# Patient Record
Sex: MALE | Race: BLACK OR AFRICAN AMERICAN | NOT HISPANIC OR LATINO | Employment: FULL TIME | URBAN - METROPOLITAN AREA
[De-identification: names, ages, dates, MRNs, and addresses within clinical notes are randomized per-mention and may not be internally consistent; named-entity substitution may affect disease eponyms.]

---

## 2023-04-25 ENCOUNTER — LAB (OUTPATIENT)
Dept: LAB | Facility: LAB | Age: 43
End: 2023-04-25
Payer: COMMERCIAL

## 2023-04-25 ENCOUNTER — OFFICE VISIT (OUTPATIENT)
Dept: PRIMARY CARE | Facility: CLINIC | Age: 43
End: 2023-04-25
Payer: COMMERCIAL

## 2023-04-25 VITALS
BODY MASS INDEX: 37.94 KG/M2 | OXYGEN SATURATION: 97 % | HEART RATE: 61 BPM | SYSTOLIC BLOOD PRESSURE: 128 MMHG | RESPIRATION RATE: 15 BRPM | WEIGHT: 295.6 LBS | DIASTOLIC BLOOD PRESSURE: 72 MMHG | HEIGHT: 74 IN | TEMPERATURE: 97.9 F

## 2023-04-25 DIAGNOSIS — Z00.00 WELLNESS EXAMINATION: Primary | ICD-10-CM

## 2023-04-25 DIAGNOSIS — J30.9 ALLERGIC RHINITIS, UNSPECIFIED SEASONALITY, UNSPECIFIED TRIGGER: ICD-10-CM

## 2023-04-25 DIAGNOSIS — Z00.00 WELLNESS EXAMINATION: ICD-10-CM

## 2023-04-25 DIAGNOSIS — Z12.5 SCREENING PSA (PROSTATE SPECIFIC ANTIGEN): ICD-10-CM

## 2023-04-25 DIAGNOSIS — Z12.11 ENCOUNTER FOR SCREENING FOR MALIGNANT NEOPLASM OF COLON: ICD-10-CM

## 2023-04-25 DIAGNOSIS — G47.33 OSA ON CPAP: ICD-10-CM

## 2023-04-25 DIAGNOSIS — Z23 NEED FOR VACCINATION: ICD-10-CM

## 2023-04-25 DIAGNOSIS — R73.03 PREDIABETES: ICD-10-CM

## 2023-04-25 DIAGNOSIS — E66.01 CLASS 2 SEVERE OBESITY WITH SERIOUS COMORBIDITY AND BODY MASS INDEX (BMI) OF 37.0 TO 37.9 IN ADULT, UNSPECIFIED OBESITY TYPE (MULTI): ICD-10-CM

## 2023-04-25 DIAGNOSIS — E78.00 HIGH CHOLESTEROL: ICD-10-CM

## 2023-04-25 PROBLEM — E66.813 OBESITY, CLASS III, BMI 40-49.9 (MORBID OBESITY): Status: RESOLVED | Noted: 2017-05-26 | Resolved: 2023-04-25

## 2023-04-25 PROBLEM — J30.1 SEASONAL ALLERGIC RHINITIS DUE TO POLLEN: Status: ACTIVE | Noted: 2019-02-26

## 2023-04-25 PROBLEM — J45.20 MILD INTERMITTENT ASTHMA WITHOUT COMPLICATION (HHS-HCC): Status: ACTIVE | Noted: 2019-02-26

## 2023-04-25 PROBLEM — E66.813 OBESITY, CLASS III, BMI 40-49.9 (MORBID OBESITY): Status: ACTIVE | Noted: 2017-05-26

## 2023-04-25 PROBLEM — S83.206A TEAR OF CARTILAGE OF RIGHT KNEE: Status: ACTIVE | Noted: 2019-09-05

## 2023-04-25 PROBLEM — M19.012 OSTEOARTHRITIS OF LEFT SHOULDER: Status: ACTIVE | Noted: 2023-04-25

## 2023-04-25 PROBLEM — N62 GYNECOMASTIA, MALE: Status: ACTIVE | Noted: 2023-04-25

## 2023-04-25 PROBLEM — M79.601 PAIN OF RIGHT UPPER EXTREMITY: Status: ACTIVE | Noted: 2023-04-25

## 2023-04-25 PROBLEM — M25.521 RIGHT ELBOW PAIN: Status: ACTIVE | Noted: 2023-04-25

## 2023-04-25 PROBLEM — M25.512 ARTHRALGIA OF LEFT ACROMIOCLAVICULAR JOINT: Status: ACTIVE | Noted: 2020-01-24

## 2023-04-25 PROBLEM — J45.20 MILD INTERMITTENT ASTHMA WITHOUT COMPLICATION (HHS-HCC): Status: RESOLVED | Noted: 2019-02-26 | Resolved: 2023-04-25

## 2023-04-25 LAB
ALANINE AMINOTRANSFERASE (SGPT) (U/L) IN SER/PLAS: 19 U/L (ref 10–52)
ALBUMIN (G/DL) IN SER/PLAS: 4.4 G/DL (ref 3.4–5)
ALKALINE PHOSPHATASE (U/L) IN SER/PLAS: 60 U/L (ref 33–120)
ANION GAP IN SER/PLAS: 10 MMOL/L (ref 10–20)
ASPARTATE AMINOTRANSFERASE (SGOT) (U/L) IN SER/PLAS: 17 U/L (ref 9–39)
BASOPHILS (10*3/UL) IN BLOOD BY AUTOMATED COUNT: 0.05 X10E9/L (ref 0–0.1)
BASOPHILS/100 LEUKOCYTES IN BLOOD BY AUTOMATED COUNT: 0.8 % (ref 0–2)
BILIRUBIN TOTAL (MG/DL) IN SER/PLAS: 0.6 MG/DL (ref 0–1.2)
CALCIUM (MG/DL) IN SER/PLAS: 10.2 MG/DL (ref 8.6–10.6)
CARBON DIOXIDE, TOTAL (MMOL/L) IN SER/PLAS: 31 MMOL/L (ref 21–32)
CHLORIDE (MMOL/L) IN SER/PLAS: 105 MMOL/L (ref 98–107)
CHOLESTEROL (MG/DL) IN SER/PLAS: 194 MG/DL (ref 0–199)
CHOLESTEROL IN HDL (MG/DL) IN SER/PLAS: 75 MG/DL
CHOLESTEROL/HDL RATIO: 2.6
CREATININE (MG/DL) IN SER/PLAS: 1.02 MG/DL (ref 0.5–1.3)
EOSINOPHILS (10*3/UL) IN BLOOD BY AUTOMATED COUNT: 0.31 X10E9/L (ref 0–0.7)
EOSINOPHILS/100 LEUKOCYTES IN BLOOD BY AUTOMATED COUNT: 4.7 % (ref 0–6)
ERYTHROCYTE DISTRIBUTION WIDTH (RATIO) BY AUTOMATED COUNT: 14 % (ref 11.5–14.5)
ERYTHROCYTE MEAN CORPUSCULAR HEMOGLOBIN CONCENTRATION (G/DL) BY AUTOMATED: 31.6 G/DL (ref 32–36)
ERYTHROCYTE MEAN CORPUSCULAR VOLUME (FL) BY AUTOMATED COUNT: 86 FL (ref 80–100)
ERYTHROCYTES (10*6/UL) IN BLOOD BY AUTOMATED COUNT: 5.03 X10E12/L (ref 4.5–5.9)
ESTIMATED AVERAGE GLUCOSE FOR HBA1C: 111 MG/DL
GFR MALE: >90 ML/MIN/1.73M2
GLUCOSE (MG/DL) IN SER/PLAS: 93 MG/DL (ref 74–99)
HEMATOCRIT (%) IN BLOOD BY AUTOMATED COUNT: 43.1 % (ref 41–52)
HEMOGLOBIN (G/DL) IN BLOOD: 13.6 G/DL (ref 13.5–17.5)
HEMOGLOBIN A1C/HEMOGLOBIN TOTAL IN BLOOD: 5.5 %
IMMATURE GRANULOCYTES/100 LEUKOCYTES IN BLOOD BY AUTOMATED COUNT: 0.2 % (ref 0–0.9)
LDL: 108 MG/DL (ref 0–99)
LEUKOCYTES (10*3/UL) IN BLOOD BY AUTOMATED COUNT: 6.6 X10E9/L (ref 4.4–11.3)
LYMPHOCYTES (10*3/UL) IN BLOOD BY AUTOMATED COUNT: 2.77 X10E9/L (ref 1.2–4.8)
LYMPHOCYTES/100 LEUKOCYTES IN BLOOD BY AUTOMATED COUNT: 42 % (ref 13–44)
MONOCYTES (10*3/UL) IN BLOOD BY AUTOMATED COUNT: 0.63 X10E9/L (ref 0.1–1)
MONOCYTES/100 LEUKOCYTES IN BLOOD BY AUTOMATED COUNT: 9.5 % (ref 2–10)
NEUTROPHILS (10*3/UL) IN BLOOD BY AUTOMATED COUNT: 2.83 X10E9/L (ref 1.2–7.7)
NEUTROPHILS/100 LEUKOCYTES IN BLOOD BY AUTOMATED COUNT: 42.8 % (ref 40–80)
NRBC (PER 100 WBCS) BY AUTOMATED COUNT: 0 /100 WBC (ref 0–0)
PLATELETS (10*3/UL) IN BLOOD AUTOMATED COUNT: 199 X10E9/L (ref 150–450)
POTASSIUM (MMOL/L) IN SER/PLAS: 4.3 MMOL/L (ref 3.5–5.3)
PROSTATE SPECIFIC ANTIGEN,SCREEN: 0.98 NG/ML (ref 0–4)
PROTEIN TOTAL: 6.7 G/DL (ref 6.4–8.2)
SODIUM (MMOL/L) IN SER/PLAS: 142 MMOL/L (ref 136–145)
THYROTROPIN (MIU/L) IN SER/PLAS BY DETECTION LIMIT <= 0.05 MIU/L: 1.52 MIU/L (ref 0.44–3.98)
TRIGLYCERIDE (MG/DL) IN SER/PLAS: 56 MG/DL (ref 0–149)
UREA NITROGEN (MG/DL) IN SER/PLAS: 12 MG/DL (ref 6–23)
VLDL: 11 MG/DL (ref 0–40)

## 2023-04-25 PROCEDURE — 1036F TOBACCO NON-USER: CPT | Performed by: FAMILY MEDICINE

## 2023-04-25 PROCEDURE — 99396 PREV VISIT EST AGE 40-64: CPT | Performed by: FAMILY MEDICINE

## 2023-04-25 PROCEDURE — 90472 IMMUNIZATION ADMIN EACH ADD: CPT | Performed by: FAMILY MEDICINE

## 2023-04-25 PROCEDURE — 90715 TDAP VACCINE 7 YRS/> IM: CPT | Performed by: FAMILY MEDICINE

## 2023-04-25 PROCEDURE — 80061 LIPID PANEL: CPT

## 2023-04-25 PROCEDURE — 90471 IMMUNIZATION ADMIN: CPT | Performed by: FAMILY MEDICINE

## 2023-04-25 PROCEDURE — 83036 HEMOGLOBIN GLYCOSYLATED A1C: CPT

## 2023-04-25 PROCEDURE — 36415 COLL VENOUS BLD VENIPUNCTURE: CPT

## 2023-04-25 PROCEDURE — 80053 COMPREHEN METABOLIC PANEL: CPT

## 2023-04-25 PROCEDURE — 99213 OFFICE O/P EST LOW 20 MIN: CPT | Performed by: FAMILY MEDICINE

## 2023-04-25 PROCEDURE — 84443 ASSAY THYROID STIM HORMONE: CPT

## 2023-04-25 PROCEDURE — 90739 HEPB VACC 2/4 DOSE ADULT IM: CPT | Performed by: FAMILY MEDICINE

## 2023-04-25 PROCEDURE — 84153 ASSAY OF PSA TOTAL: CPT

## 2023-04-25 PROCEDURE — 85025 COMPLETE CBC W/AUTO DIFF WBC: CPT

## 2023-04-25 PROCEDURE — 3008F BODY MASS INDEX DOCD: CPT | Performed by: FAMILY MEDICINE

## 2023-04-25 RX ORDER — MONTELUKAST SODIUM 10 MG/1
10 TABLET ORAL NIGHTLY
Qty: 90 TABLET | Refills: 1 | Status: SHIPPED | OUTPATIENT
Start: 2023-04-25 | End: 2023-10-22

## 2023-04-25 RX ORDER — MULTIVITAMIN
1 TABLET ORAL
COMMUNITY

## 2023-04-25 RX ORDER — LORATADINE 10 MG/1
10 TABLET ORAL
COMMUNITY
Start: 2019-02-26 | End: 2024-03-05 | Stop reason: WASHOUT

## 2023-04-25 RX ORDER — ALBUTEROL SULFATE 90 UG/1
AEROSOL, METERED RESPIRATORY (INHALATION)
COMMUNITY
Start: 2019-02-26

## 2023-04-25 RX ORDER — FLUTICASONE PROPIONATE 93 UG/1
93 SPRAY, METERED NASAL 2 TIMES DAILY
Qty: 16 ML | Refills: 3 | Status: SHIPPED | OUTPATIENT
Start: 2023-04-25

## 2023-04-25 ASSESSMENT — PATIENT HEALTH QUESTIONNAIRE - PHQ9
SUM OF ALL RESPONSES TO PHQ9 QUESTIONS 1 AND 2: 0
2. FEELING DOWN, DEPRESSED OR HOPELESS: NOT AT ALL
1. LITTLE INTEREST OR PLEASURE IN DOING THINGS: NOT AT ALL

## 2023-04-25 ASSESSMENT — LIFESTYLE VARIABLES: HOW MANY STANDARD DRINKS CONTAINING ALCOHOL DO YOU HAVE ON A TYPICAL DAY: PATIENT DOES NOT DRINK

## 2023-04-25 NOTE — PATIENT INSTRUCTIONS
Assessment/Plan   Problem List Items Addressed This Visit          Nervous    KAHLIL on CPAP  - to call Dr. Hamilton for appt       Circulatory    Essential hypertension= resolved after using CPAP      Relevant Orders    Comprehensive Metabolic Panel    Hemoglobin A1C    Lipid Panel    TSH with reflex to Free T4 if abnormal       Other    Allergic rhinitis  - starting on new meds below  -let me know if not better and if there is an issue with the Xhance    Relevant Medications    montelukast (Singulair) 10 mg tablet    fluticasone propionate (Xhance) 93 mcg/actuation aerosol breath activated    Other Relevant Orders    Referral to Allergy    High cholesterol  - check labs    Relevant Orders    Comprehensive Metabolic Panel    Hemoglobin A1C    Lipid Panel    TSH with reflex to Free T4 if abnormal     Other Visit Diagnoses       Wellness examination    -  Primary  - Continue to work on healthy diet and exercise  We encourage all patients to engage in exercise 150 minutes per week   Adults 18 and older, activity during each week - if you are able:    Engage in at least 150 minutes of moderate or vigorous exercise per week   If you are able- Engage in muscle strengthening activity on 2 or more days per week      Relevant Orders    Colonoscopy    CBC and Auto Differential    Comprehensive Metabolic Panel    Hemoglobin A1C    Lipid Panel    Prostate Specific Antigen, Screen    TSH with reflex to Free T4 if abnormal    Prediabetes      - wt loss can help  - labs    Relevant Orders    CBC and Auto Differential    Comprehensive Metabolic Panel    Hemoglobin A1C    Lipid Panel    TSH with reflex to Free T4 if abnormal    Screening PSA (prostate specific antigen)   Checking again today       Relevant Orders    Prostate Specific Antigen, Screen    Class 2 severe obesity with serious comorbidity and body mass index (BMI) of 37.0 to 37.9 in adult, unspecified obesity type (CMS/HCC)      - wt loss can help    Relevant Orders    CBC and  Auto Differential    Comprehensive Metabolic Panel    Hemoglobin A1C    Lipid Panel    TSH with reflex to Free T4 if abnormal    Encounter for screening for malignant neoplasm of colon    - ordered colonoscopy      Relevant Orders    Colonoscopy    Need for vaccination      - TODAY the below was given    Relevant Orders    Hepatitis B vaccine, adult (HEPLISAV)- repeat due in 1 month    Tdap vaccine, age 10 years and older (BOOSTRIX)            Please follow up in 4wks for hep B #2 with nurse clinic and 3 months with PCP for allergy or as needed.       ** If labs or imaging ordered at today's visit, all the non-urgent results will be discussed at your next visit    If you have been referred for a special test or to a specialist please call  [phone] to [facility] an appointment.  If you have any further questions, or if develop new or worsened symptoms, please give our office a call at [phone].

## 2023-04-25 NOTE — PROGRESS NOTES
"Subjective   Patient ID: GAMALIEL Will is a 43 y.o. male who presents for Annual Exam.    Well Adult Physical   Does not want a chaperone.      Health Maintenance:  - Colonoscopy: due at 43yr- ordered  - PSA- due -ordered   COVID vaccination completed- including bivalent booster   Due fort TDAP and Hep B series- given today      Other concerns/issues/followup:  1 - KAHLIL X 10yrs+  using CPAP- sleeping well with   Would like follow up with sleep study- past due for follow up    2 - allergy   season is restarting with post nasal drainage   States allergies have worsen        PMSFH was reviewed & updated.    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 TDAP    Review of Systems  All systems reviewed and neg if not noted in the HPI above   Objective   /72 (Patient Position: Sitting)   Pulse 61   Temp 36.6 °C (97.9 °F)   Resp 15   Ht 1.88 m (6' 2\")   Wt 134 kg (295 lb 9.6 oz)   SpO2 97%   BMI 37.95 kg/m²     Physical Exam  Pleasant  Eyes: conjunctiva non-icteric and eye lids are without obvious rash or drooping. Pupils are symmetric.   Ears, Nose, Mouth, and Throat: External ears and nose appear to be without deformity or rash. No lesions or masses noted. Hearing is grossly intact.   CV: RRR, no murmur  Carotids: no bruits  Thyroid nml  Pulm:CTA B/L  Abd: soft, NTTP, + BS  LE: no edema  Psychiatric: Alert, orientation to person, place, and time. Recent/remote memory as evidenced through face-to-face interaction and discussion appear grossly intact. Mood and affect are normal.     Assessment/Plan   Problem List Items Addressed This Visit          Nervous    KAHLIL on CPAP  - to call Dr. Hamilton for appt       Circulatory    Essential hypertension= resolved after using CPAP      " Relevant Orders    Comprehensive Metabolic Panel    Hemoglobin A1C    Lipid Panel    TSH with reflex to Free T4 if abnormal       Other    Allergic rhinitis  - starting on new meds below  -let me know if not better and if there is an issue with the Xhance    Relevant Medications    montelukast (Singulair) 10 mg tablet    fluticasone propionate (Xhance) 93 mcg/actuation aerosol breath activated    Other Relevant Orders    Referral to Allergy    High cholesterol  - check labs    Relevant Orders    Comprehensive Metabolic Panel    Hemoglobin A1C    Lipid Panel    TSH with reflex to Free T4 if abnormal     Other Visit Diagnoses       Wellness examination    -  Primary  - Continue to work on healthy diet and exercise  We encourage all patients to engage in exercise 150 minutes per week   Adults 18 and older, activity during each week - if you are able:    Engage in at least 150 minutes of moderate or vigorous exercise per week   If you are able- Engage in muscle strengthening activity on 2 or more days per week      Relevant Orders    Colonoscopy    CBC and Auto Differential    Comprehensive Metabolic Panel    Hemoglobin A1C    Lipid Panel    Prostate Specific Antigen, Screen    TSH with reflex to Free T4 if abnormal    Prediabetes      - wt loss can help  - labs    Relevant Orders    CBC and Auto Differential    Comprehensive Metabolic Panel    Hemoglobin A1C    Lipid Panel    TSH with reflex to Free T4 if abnormal    Screening PSA (prostate specific antigen)   Checking again today       Relevant Orders    Prostate Specific Antigen, Screen    Class 2 severe obesity with serious comorbidity and body mass index (BMI) of 37.0 to 37.9 in adult, unspecified obesity type (CMS/HCC)      - wt loss can help    Relevant Orders    CBC and Auto Differential    Comprehensive Metabolic Panel    Hemoglobin A1C    Lipid Panel    TSH with reflex to Free T4 if abnormal    Encounter for screening for malignant neoplasm of colon    -  ordered colonoscopy      Relevant Orders    Colonoscopy    Need for vaccination      - TODAY the below was given    Relevant Orders    Hepatitis B vaccine, adult (HEPLISAV)- repeat due in 1 month    Tdap vaccine, age 10 years and older (BOOSTRIX)            Please follow up in 4wks for hep B #2 with nurse clinic and 3 months with PCP for allergy or as needed.       ** If labs or imaging ordered at today's visit, all the non-urgent results will be discussed at your next visit    If you have been referred for a special test or to a specialist please call  [phone] to schedule an appointment.  If you have any further questions, or if develop new or worsened symptoms, please give our office a call at [phone].

## 2023-04-26 ENCOUNTER — TELEPHONE (OUTPATIENT)
Dept: PRIMARY CARE | Facility: CLINIC | Age: 43
End: 2023-04-26
Payer: COMMERCIAL

## 2023-04-26 NOTE — TELEPHONE ENCOUNTER
Result Communication    Resulted Orders   CBC and Auto Differential   Result Value Ref Range    WBC 6.6 4.4 - 11.3 x10E9/L    nRBC 0.0 0.0 - 0.0 /100 WBC    RBC 5.03 4.50 - 5.90 x10E12/L    Hemoglobin 13.6 13.5 - 17.5 g/dL    Hematocrit 43.1 41.0 - 52.0 %    MCV 86 80 - 100 fL    MCHC 31.6 (L) 32.0 - 36.0 g/dL    Platelets 199 150 - 450 x10E9/L    RDW 14.0 11.5 - 14.5 %    Neutrophils % 42.8 40.0 - 80.0 %    Immature Granulocytes %, Automated 0.2 0.0 - 0.9 %      Comment:       Immature Granulocyte Count (IG) includes promyelocytes,    myelocytes and metamyelocytes but does not include bands.   Percent differential counts (%) should be interpreted in the   context of the absolute cell counts (cells/L).    Lymphocytes % 42.0 13.0 - 44.0 %    Monocytes % 9.5 2.0 - 10.0 %    Eosinophils % 4.7 0.0 - 6.0 %    Basophils % 0.8 0.0 - 2.0 %    Neutrophils Absolute 2.83 1.20 - 7.70 x10E9/L    Lymphocytes Absolute 2.77 1.20 - 4.80 x10E9/L    Monocytes Absolute 0.63 0.10 - 1.00 x10E9/L    Eosinophils Absolute 0.31 0.00 - 0.70 x10E9/L    Basophils Absolute 0.05 0.00 - 0.10 x10E9/L   Comprehensive Metabolic Panel   Result Value Ref Range    Glucose 93 74 - 99 mg/dL    Sodium 142 136 - 145 mmol/L    Potassium 4.3 3.5 - 5.3 mmol/L    Chloride 105 98 - 107 mmol/L    Bicarbonate 31 21 - 32 mmol/L    Anion Gap 10 10 - 20 mmol/L    Urea Nitrogen 12 6 - 23 mg/dL    Creatinine 1.02 0.50 - 1.30 mg/dL    GFR MALE >90 >90 mL/min/1.73m2      Comment:       CALCULATIONS OF ESTIMATED GFR ARE PERFORMED   USING THE 2021 CKD-EPI STUDY REFIT EQUATION   WITHOUT THE RACE VARIABLE FOR THE IDMS-TRACEABLE   CREATININE METHODS.    https://jasn.asnjournals.org/content/early/2021/09/22/ASN.3852875266    Calcium 10.2 8.6 - 10.6 mg/dL    Albumin 4.4 3.4 - 5.0 g/dL    Alkaline Phosphatase 60 33 - 120 U/L    Total Protein 6.7 6.4 - 8.2 g/dL    AST 17 9 - 39 U/L    Total Bilirubin 0.6 0.0 - 1.2 mg/dL    ALT (SGPT) 19 10 - 52 U/L      Comment:       Patients  treated with Sulfasalazine may generate    falsely decreased results for ALT.   Hemoglobin A1C   Result Value Ref Range    Hemoglobin A1C 5.5 %      Comment:           Diagnosis of Diabetes-Adults   Non-Diabetic: < or = 5.6%   Increased risk for developing diabetes: 5.7-6.4%   Diagnostic of diabetes: > or = 6.5%  .       Monitoring of Diabetes                Age (y)     Therapeutic Goal (%)   Adults:          >18           <7.0   Pediatrics:    13-18           <7.5                   7-12           <8.0                   0- 6            7.5-8.5   American Diabetes Association. Diabetes Care 33(S1), Jan 2010.    Estimated Average Glucose 111 MG/DL   Lipid Panel   Result Value Ref Range    Cholesterol 194 0 - 199 mg/dL      Comment:      .      AGE      DESIRABLE   BORDERLINE HIGH   HIGH     0-19 Y     0 - 169       170 - 199     >/= 200    20-24 Y     0 - 189       190 - 224     >/= 225         >24 Y     0 - 199       200 - 239     >/= 240   **All ranges are based on fasting samples. Specific   therapeutic targets will vary based on patient-specific   cardiac risk.  .   Pediatric guidelines reference:Pediatrics 2011, 128(S5).   Adult guidelines reference: NCEP ATPIII Guidelines,     LONA 2001, 258:2486-97  .   Venipuncture immediately after or during the    administration of Metamizole may lead to falsely   low results. Testing should be performed immediately   prior to Metamizole dosing.    HDL 75.0 mg/dL      Comment:      .      AGE      VERY LOW   LOW     NORMAL    HIGH       0-19 Y       < 35   < 40     40-45     ----    20-24 Y       ----   < 40       >45     ----      >24 Y       ----   < 40     40-60      >60  .    Cholesterol/HDL Ratio 2.6       Comment:      REF VALUES  DESIRABLE  < 3.4  HIGH RISK  > 5.0     (H) 0 - 99 mg/dL      Comment:      .                           NEAR      BORD      AGE      DESIRABLE  OPTIMAL    HIGH     HIGH     VERY HIGH     0-19 Y     0 - 109     ---    110-129   >/= 130      ----    20-24 Y     0 - 119     ---    120-159   >/= 160     ----      >24 Y     0 -  99   100-129  130-159   160-189     >/=190  .    VLDL 11 0 - 40 mg/dL    Triglycerides 56 0 - 149 mg/dL      Comment:      .      AGE      DESIRABLE   BORDERLINE HIGH   HIGH     VERY HIGH   0 D-90 D    19 - 174         ----         ----        ----  91 D- 9 Y     0 -  74        75 -  99     >/= 100      ----    10-19 Y     0 -  89        90 - 129     >/= 130      ----    20-24 Y     0 - 114       115 - 149     >/= 150      ----         >24 Y     0 - 149       150 - 199    200- 499    >/= 500  .   Venipuncture immediately after or during the    administration of Metamizole may lead to falsely   low results. Testing should be performed immediately   prior to Metamizole dosing.   Prostate Specific Antigen, Screen   Result Value Ref Range    Prostate Specific Antigen,Screen 0.98 0.00 - 4.00 ng/mL      Comment:      The FDA requires that the method used for PSA assay be   reported to the physician. Values obtained with different   assay methods must not be used interchangeably. This test   was performed at [facility] using the Siemens  Sensdata PSA method, which is a sandwich immunoassay using   chemiluminescence for quantitation. The assay is approved  for measurement of prostate-specific antigen (PSA) in   serum and may be used in conjunction with a digital rectal  examination in men 50 years and older as an aid in   detection of prostate cancer.   5-Alpha-reductase inhibitors (e.g. Proscar, Finasteride,   Avodart, Dutasteride and Ashley) for the treatment of BPH   have been shown to lower PSA levels by an average of 50%   after 6 months of treatment.   TSH with reflex to Free T4 if abnormal   Result Value Ref Range    TSH 1.52 0.44 - 3.98 mIU/L      Comment:       TSH testing is performed using different testing    methodology at [facility] than at other    [facility] hospitals. Direct result comparisons  should    only be made within the same method.       9:37 AM      Results {WERE / WERE NOT:06885} successfully communicated with the {RHEUM PARENT/PATIENT:93590} and they {AMB Acknowledged/Did Not Acknowledge:32539} their understanding.

## 2023-05-25 ENCOUNTER — CLINICAL SUPPORT (OUTPATIENT)
Dept: PRIMARY CARE | Facility: CLINIC | Age: 43
End: 2023-05-25
Payer: COMMERCIAL

## 2023-05-25 VITALS
HEART RATE: 85 BPM | RESPIRATION RATE: 16 BRPM | WEIGHT: 295 LBS | BODY MASS INDEX: 37.86 KG/M2 | OXYGEN SATURATION: 98 % | SYSTOLIC BLOOD PRESSURE: 126 MMHG | TEMPERATURE: 97.7 F | DIASTOLIC BLOOD PRESSURE: 72 MMHG | HEIGHT: 74 IN

## 2023-05-25 DIAGNOSIS — Z23 ENCOUNTER FOR IMMUNIZATION: ICD-10-CM

## 2023-05-25 PROCEDURE — 90739 HEPB VACC 2/4 DOSE ADULT IM: CPT | Performed by: FAMILY MEDICINE

## 2023-05-25 PROCEDURE — 90471 IMMUNIZATION ADMIN: CPT | Performed by: FAMILY MEDICINE

## 2023-05-25 NOTE — PROGRESS NOTES
Pt here for second Hep-B vaccine. Pt has not received any other vaccine within the past two weeks. Administered 0.5 ml IM in RD of Heplisav-B. Pt tolerated well and vitals are stable. Pt will refrain from any other vaccine for two weeks.

## 2023-08-10 ENCOUNTER — OFFICE VISIT (OUTPATIENT)
Dept: PRIMARY CARE | Facility: CLINIC | Age: 43
End: 2023-08-10
Payer: COMMERCIAL

## 2023-08-10 VITALS
TEMPERATURE: 97 F | HEART RATE: 64 BPM | DIASTOLIC BLOOD PRESSURE: 78 MMHG | HEIGHT: 74 IN | BODY MASS INDEX: 37.73 KG/M2 | RESPIRATION RATE: 15 BRPM | OXYGEN SATURATION: 95 % | WEIGHT: 294 LBS | SYSTOLIC BLOOD PRESSURE: 122 MMHG

## 2023-08-10 DIAGNOSIS — Z00.00 WELLNESS EXAMINATION: ICD-10-CM

## 2023-08-10 DIAGNOSIS — E66.01 CLASS 2 SEVERE OBESITY WITH SERIOUS COMORBIDITY AND BODY MASS INDEX (BMI) OF 37.0 TO 37.9 IN ADULT, UNSPECIFIED OBESITY TYPE (MULTI): ICD-10-CM

## 2023-08-10 DIAGNOSIS — Z12.5 SCREENING PSA (PROSTATE SPECIFIC ANTIGEN): ICD-10-CM

## 2023-08-10 DIAGNOSIS — E78.00 HIGH CHOLESTEROL: ICD-10-CM

## 2023-08-10 DIAGNOSIS — J30.1 SEASONAL ALLERGIC RHINITIS DUE TO POLLEN: Primary | ICD-10-CM

## 2023-08-10 DIAGNOSIS — R09.81 NASAL CONGESTION: ICD-10-CM

## 2023-08-10 PROCEDURE — 1036F TOBACCO NON-USER: CPT | Performed by: FAMILY MEDICINE

## 2023-08-10 PROCEDURE — 3008F BODY MASS INDEX DOCD: CPT | Performed by: FAMILY MEDICINE

## 2023-08-10 PROCEDURE — 99213 OFFICE O/P EST LOW 20 MIN: CPT | Performed by: FAMILY MEDICINE

## 2023-08-10 RX ORDER — EPINASTINE HYDROCHLORIDE 0.5 MG/ML
1 SOLUTION/ DROPS OPHTHALMIC 2 TIMES DAILY
COMMUNITY
Start: 2023-07-09 | End: 2024-03-05 | Stop reason: WASHOUT

## 2023-08-10 ASSESSMENT — PATIENT HEALTH QUESTIONNAIRE - PHQ9
2. FEELING DOWN, DEPRESSED OR HOPELESS: NOT AT ALL
1. LITTLE INTEREST OR PLEASURE IN DOING THINGS: NOT AT ALL
SUM OF ALL RESPONSES TO PHQ9 QUESTIONS 1 AND 2: 0

## 2023-08-10 NOTE — PROGRESS NOTES
"Subjective   Patient ID: GAMALIEL Will is a 43 y.o. male who presents for Follow-up (Pt is following up for allergies.).    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   Ears, Nose, Mouth, and Throat:  appear to be without deformity or rash. No lesions or masses noted. Hearing is grossly intact.   Respiratory: No gasping or shortness of breath noted, no use of accessory muscles noted.   Skin: No obvious rashes or lesions  identified on skin.   Psychiatric: Alert, orientation to person, place, and time. Recent/remote memory as evidenced through face-to-face interaction and discussion appear grossly intact.   Mood and affect are normal.        Assessment/Plan   Problem List Items Addressed This Visit       Allergic rhinitis - Primary    High cholesterol    Relevant Orders    CBC and Auto Differential    Comprehensive Metabolic Panel    Hemoglobin A1C    Lipid Panel    Prostate Specific Antigen    TSH with reflex to Free T4 if abnormal     Other Visit Diagnoses       Wellness examination        Relevant Orders    CBC and Auto Differential    Comprehensive Metabolic Panel    Hemoglobin A1C    Lipid Panel    Prostate Specific Antigen    TSH with reflex to Free T4 if " abnormal    Screening PSA (prostate specific antigen)        Relevant Orders    CBC and Auto Differential    Comprehensive Metabolic Panel    Hemoglobin A1C    Lipid Panel    Prostate Specific Antigen    TSH with reflex to Free T4 if abnormal    Class 2 severe obesity with serious comorbidity and body mass index (BMI) of 37.0 to 37.9 in adult, unspecified obesity type (CMS/HCC)        Relevant Orders    CBC and Auto Differential    Comprehensive Metabolic Panel    Hemoglobin A1C    Lipid Panel    Prostate Specific Antigen    TSH with reflex to Free T4 if abnormal              Please follow up in March for Wellness  (as scheduled)or as needed.

## 2023-08-10 NOTE — PATIENT INSTRUCTIONS
Allergies  - ok to start the singular 2months prior to your allergy season in the spring- Feb    Continue with cycling    Nasal congestion  - referral for ENT    Please follow up in March for Wellness  (as scheduled)or as needed.       ** If labs or imaging ordered at today's visit, all the non-urgent results will be discussed at your next visit    If you have been referred for a special test or to a specialist please call  [phone] to schedule an appointment.  If you have any further questions, or if develop new or worsened symptoms, please give our office a call at [phone].

## 2024-01-08 ENCOUNTER — TELEPHONE (OUTPATIENT)
Dept: SLEEP MEDICINE | Facility: HOSPITAL | Age: 44
End: 2024-01-08
Payer: COMMERCIAL

## 2024-01-08 NOTE — TELEPHONE ENCOUNTER
Called and left patient VM to call back and schedule their yearly appointment with Dr. Hamilton to renew his PAP supply prescription. Central Scheduling number [phone] given to call back for schedulingand Sleep Nurse number [phone] given to call back with any questions.

## 2024-01-16 ENCOUNTER — ANCILLARY PROCEDURE (OUTPATIENT)
Dept: RADIOLOGY | Facility: CLINIC | Age: 44
End: 2024-01-16
Payer: COMMERCIAL

## 2024-01-16 ENCOUNTER — OFFICE VISIT (OUTPATIENT)
Dept: ORTHOPEDIC SURGERY | Facility: CLINIC | Age: 44
End: 2024-01-16
Payer: COMMERCIAL

## 2024-01-16 VITALS — HEIGHT: 74 IN | BODY MASS INDEX: 37.73 KG/M2 | WEIGHT: 294 LBS

## 2024-01-16 DIAGNOSIS — M77.8 ELBOW TENDONITIS: ICD-10-CM

## 2024-01-16 DIAGNOSIS — G89.29 CHRONIC PAIN OF RIGHT ELBOW: Primary | ICD-10-CM

## 2024-01-16 DIAGNOSIS — M19.029 ELBOW ARTHRITIS: ICD-10-CM

## 2024-01-16 DIAGNOSIS — M25.521 CHRONIC PAIN OF RIGHT ELBOW: Primary | ICD-10-CM

## 2024-01-16 DIAGNOSIS — M25.521 RIGHT ELBOW PAIN: Primary | ICD-10-CM

## 2024-01-16 DIAGNOSIS — M25.521 RIGHT ELBOW PAIN: ICD-10-CM

## 2024-01-16 PROBLEM — S83.206A TEAR OF CARTILAGE OF RIGHT KNEE: Status: RESOLVED | Noted: 2019-09-05 | Resolved: 2024-01-16

## 2024-01-16 PROCEDURE — 3008F BODY MASS INDEX DOCD: CPT | Performed by: ORTHOPAEDIC SURGERY

## 2024-01-16 PROCEDURE — 73080 X-RAY EXAM OF ELBOW: CPT | Mod: RIGHT SIDE | Performed by: STUDENT IN AN ORGANIZED HEALTH CARE EDUCATION/TRAINING PROGRAM

## 2024-01-16 PROCEDURE — 1036F TOBACCO NON-USER: CPT | Performed by: ORTHOPAEDIC SURGERY

## 2024-01-16 PROCEDURE — 73080 X-RAY EXAM OF ELBOW: CPT | Mod: RT

## 2024-01-16 PROCEDURE — 99213 OFFICE O/P EST LOW 20 MIN: CPT | Performed by: ORTHOPAEDIC SURGERY

## 2024-01-16 PROCEDURE — 99203 OFFICE O/P NEW LOW 30 MIN: CPT | Performed by: ORTHOPAEDIC SURGERY

## 2024-01-16 RX ORDER — MELOXICAM 15 MG/1
15 TABLET ORAL DAILY
Qty: 30 TABLET | Refills: 1 | Status: SHIPPED | OUTPATIENT
Start: 2024-01-16 | End: 2025-01-15

## 2024-01-16 ASSESSMENT — PAIN DESCRIPTION - DESCRIPTORS: DESCRIPTORS: ACHING

## 2024-01-16 ASSESSMENT — PAIN - FUNCTIONAL ASSESSMENT: PAIN_FUNCTIONAL_ASSESSMENT: 0-10

## 2024-01-16 ASSESSMENT — PAIN SCALES - GENERAL: PAINLEVEL_OUTOF10: 7

## 2024-01-16 NOTE — LETTER
January 17, 2024     Claribel Xavier DO  [address]  [address]  [facility] [address]    Patient: ODILON Will   YOB: 1980   Date of Visit: 1/16/2024       Dear Dr. Claribel Xavier DO:    Thank you for referring ODILON Will to me for evaluation. Below are my notes for this consultation.  If you have questions, please do not hesitate to call me. I look forward to following your patient along with you.       Sincerely,     Shaan Atkins MD      CC: No Recipients  ______________________________________________________________________________________    Subjective   Patient ID: ODILON Will is a 43 y.o. male.    Chief Complaint: Pain of the Right Elbow (Popping out of place)     Last Surgery: No surgery found  Last Surgery Date: No surgery found    HPI  Patient ID: Odilon Will is a 43 y.o. male patient who presents today for Pain of the Right Elbow (Popping out of place). The patient had elbow pain several years ago where he visited Dr. Gutiérrez. He was diagnosed with bicep and tricep tendonitis. This resolved overtime but was recently exacerbated by over the head movements and doing things over the holidays. Patient reports no injury. Patient reports dull pain, soreness, and aches. He experiences a popping sensation in his elbow at extension. He was not able to completely straighten the elbow but experienced no pain until recently. He experiences difficulty brushing teeth and shaving back of head.     Objective  Ortho Exam    Physical Examination Findings:  Constitutional: Appears well-developed and well-nourished.  Head: Normocephalic and atraumatic.  Eyes: Pupils are equal and round.  Cardiovascular: Intact distal pulses.   Respiratory: Effort normal. No respiratory distress.  Neurologic: Alert and oriented to person, place, and time.  Skin: Skin is warm and dry.  Hematologic / Lympahtic: No lymphedema, lymphangitis.  Psychiatric: normal mood and affect. Behavior is normal.    Musculoskeletal: Normal appearance, full elbow flexion, raOM smooth, popping in elbow joint, bicep tenod I strong, mild tricep tenderne to pal, 5/5 strength normal distal motor and vascular examination.    X-ray shows degenerative early arthritis in elbow.     Assessment/Plan  Encounter Diagnoses:  Chronic pain of right elbow    Elbow arthritis    Elbow tendonitis       Recommended a conservative approach to these symptoms.  Referral to therapy provided with a prescription for anti-inflammatory medications.  He will contact my office if his symptoms or not improving after the next 6 to 8 weeks of this program.  In that situation we would like him to obtain a CT scan of the right elbow prior to follow-up      Scribe Attestation  By signing my name below, I, Ruben Oliveros , Lars   attest that this documentation has been prepared under the direction and in the presence of Shaan Atkins MD.

## 2024-01-16 NOTE — PROGRESS NOTES
Subjective    Patient ID: GAMALIEL Will is a 43 y.o. male.    Chief Complaint: Pain of the Right Elbow (Popping out of place)     Last Surgery: No surgery found  Last Surgery Date: No surgery found    HPI  Patient ID: Gamaliel Will is a 43 y.o. male patient who presents today for Pain of the Right Elbow (Popping out of place). The patient had elbow pain several years ago where he visited Dr. Gutiérrez. He was diagnosed with bicep and tricep tendonitis. This resolved overtime but was recently exacerbated by over the head movements and doing things over the holidays. Patient reports no injury. Patient reports dull pain, soreness, and aches. He experiences a popping sensation in his elbow at extension. He was not able to completely straighten the elbow but experienced no pain until recently. He experiences difficulty brushing teeth and shaving back of head.     Objective   Ortho Exam    Physical Examination Findings:  Constitutional: Appears well-developed and well-nourished.  Head: Normocephalic and atraumatic.  Eyes: Pupils are equal and round.  Cardiovascular: Intact distal pulses.   Respiratory: Effort normal. No respiratory distress.  Neurologic: Alert and oriented to person, place, and time.  Skin: Skin is warm and dry.  Hematologic / Lympahtic: No lymphedema, lymphangitis.  Psychiatric: normal mood and affect. Behavior is normal.   Musculoskeletal: Normal appearance, full elbow flexion, raOM smooth, popping in elbow joint, bicep tenod I strong, mild tricep tenderne to pal, 5/5 strength normal distal motor and vascular examination.    X-ray shows degenerative early arthritis in elbow.     Assessment/Plan   Encounter Diagnoses:  Chronic pain of right elbow    Elbow arthritis    Elbow tendonitis       Recommended a conservative approach to these symptoms.  Referral to therapy provided with a prescription for anti-inflammatory medications.  He will contact my office if his symptoms or not improving after the next  6 to 8 weeks of this program.  In that situation we would like him to obtain a CT scan of the right elbow prior to follow-up      Scribe Attestation  By signing my name below, I, Ruben Oliveros , Lars   attest that this documentation has been prepared under the direction and in the presence of Shaan Atkins MD.

## 2024-02-26 ENCOUNTER — OFFICE VISIT (OUTPATIENT)
Dept: SLEEP MEDICINE | Facility: CLINIC | Age: 44
End: 2024-02-26
Payer: COMMERCIAL

## 2024-02-26 VITALS
HEART RATE: 51 BPM | BODY MASS INDEX: 39.8 KG/M2 | DIASTOLIC BLOOD PRESSURE: 80 MMHG | OXYGEN SATURATION: 95 % | WEIGHT: 310 LBS | RESPIRATION RATE: 16 BRPM | TEMPERATURE: 97.8 F | SYSTOLIC BLOOD PRESSURE: 132 MMHG

## 2024-02-26 DIAGNOSIS — G47.33 OSA ON CPAP: Primary | ICD-10-CM

## 2024-02-26 PROCEDURE — 99202 OFFICE O/P NEW SF 15 MIN: CPT | Performed by: PHYSICIAN ASSISTANT

## 2024-02-26 PROCEDURE — 3008F BODY MASS INDEX DOCD: CPT | Performed by: PHYSICIAN ASSISTANT

## 2024-02-26 PROCEDURE — 1036F TOBACCO NON-USER: CPT | Performed by: PHYSICIAN ASSISTANT

## 2024-02-26 ASSESSMENT — PAIN SCALES - GENERAL: PAINLEVEL: 0-NO PAIN

## 2024-02-26 NOTE — PROGRESS NOTES
Patient: Odilon Will    [id number]  : 1980 -- AGE 43 y.o.    Provider: Mary Grace Mercado PA-C     Location [facility]   Service Date: 2024              [facility]  New Visit Note      HISTORY OF PRESENT ILLNESS     The patient's referring provider is: No ref. provider found; Claribel Xavier, DO    HISTORY OF PRESENT ILLNESS   Odilon Will is a 43 y.o. male with h/o sleep apnea, allergic rhinitis, arthritis who presents to a [facility] for a sleep medicine evaluation with concerns of Establish Care.     Past Sleep History  Has been on CPAP since ~  Most recent sleep study in 2021 - indicates severe sleep apnea      Current History    On today's visit, the patient reports he is here for visit for sleep apnea. Has not been seen in slightly over 3 years (last seen by Dr. Hamilton) and he needs a prescription for new supplies. Has been on CPAP for ~13 years or so. His current device is ~3 years old. Uses N30 mask, happy with it. Denies any issues related to cpap. Does get 5-6 hours of sleep most nights and uses electronics in bed.     Denies RLS, insomnia, parasomnia, other sleep concerns.    Sleep schedule:   Bed time: 11:30P   Sleep latency: an hour so but using phone, etc  Nocturnal Awakenings: occasional, can have some trouble getting back to sleep if closer to wake up time   Wake up: 6:15A  TST:  5-6 hours     Naps: none        ESS:  8  TOSHA:  3  FOSQ: 40      REVIEW OF SYSTEMS     REVIEW OF SYSTEMS  See HPI; all other ROS were reviewed and negative for compliant        ALLERGIES AND MEDICATIONS     ALLERGIES  Allergies   Allergen Reactions    Pollen Extracts Itching and Other     Watery eyes       MEDICATIONS  Current Outpatient Medications   Medication Sig Dispense Refill    albuterol 90 mcg/actuation inhaler Inhale.      meloxicam (Mobic) 15 mg tablet Take 1 tablet (15 mg) by mouth once daily. 30 tablet 1    multivitamin  tablet Take 1 tablet by mouth once daily.      epinastine (Elestat) 0.05 % ophthalmic solution Administer 1 drop into both eyes 2 times a day.      fluticasone propionate (Xhance) 93 mcg/actuation aerosol breath activated Administer 93 mcg into affected nostril(s) in the morning and 93 mcg before bedtime. 16 mL 3    loratadine (Claritin) 10 mg tablet Take 1 tablet (10 mg) by mouth once daily.      montelukast (Singulair) 10 mg tablet Take 1 tablet (10 mg) by mouth once daily at bedtime. 90 tablet 1     No current facility-administered medications for this visit.         PAST HISTORY     PAST MEDICAL HISTORY  He  has a past medical history of Bicipital tendinitis, left shoulder (02/17/2021), Cutaneous abscess, unspecified (09/11/2020), Encounter for screening for diabetes mellitus (09/11/2020), Encounter for screening for lipoid disorders (09/11/2020), Encounter for screening for nutritional disorder (09/11/2020), Impingement syndrome of left shoulder (07/21/2021), Morbid (severe) obesity due to excess calories (CMS/[facility]) (12/29/2020), Other symptoms and signs involving the musculoskeletal system (04/30/2021), Other tear of medial meniscus, current injury, right knee, initial encounter, Pain in left shoulder (04/30/2021), Personal history of other specified conditions (12/18/2020), Sprain of other specified parts of left knee, initial encounter, Stiffness of left shoulder, not elsewhere classified (04/30/2021), and Superior glenoid labrum lesion of unspecified shoulder, initial encounter (09/14/2020).    PAST SURGICAL HISTORY:  No past surgical history on file.    FAMILY HISTORY  No family history on file.      SOCIAL HISTORY  He  reports that he has never smoked. He has never used smokeless tobacco. He reports that he does not drink alcohol and does not use drugs. He        PHYSICAL EXAM     VITAL SIGNS: /80   Pulse 51   Temp 36.6 °C (97.8 °F)   Resp 16   Wt 141 kg (310 lb)   SpO2 95%   BMI 39.80 kg/m²       CURRENT WEIGHT:   Vitals:    02/26/24 1102   Weight: 141 kg (310 lb)     Body mass index is 39.8 kg/m².  PREVIOUS WEIGHTS:  Wt Readings from Last 3 Encounters:   02/26/24 141 kg (310 lb)   01/16/24 133 kg (294 lb)   08/10/23 133 kg (294 lb)       Constitutional: Alert and oriented, cooperative, no acute distress  Head: Normocephalic, atraumatic   Cranial Features: No abnormal craniofacial features  Neck: Supple. Trachea midline.  Pulmonary: Non-labored breathing, speaks in full sentences.   Cardiac: regular rate   Extremities: No clubbing, no edema  Neuromuscular: Cranial nerves grossly intact, no focal deficits      RESULTS/DATA     Bicarbonate (mmol/L)   Date Value   04/25/2023 31   03/09/2022 31   09/11/2020 28             ASSESSMENT/PLAN     Mr. Will is a 43 y.o. male and He was referred to the [facility] for evaluation of KAHLIL    Problem List, Orders, Assessment, Recommendations:  Problem List Items Addressed This Visit             ICD-10-CM    KAHLIL on CPAP - Primary G47.33     -update supply order  -continue current settings  -avoid drowsy driving          Relevant Orders    Positive Airway Pressure (PAP) Therapy     We also did discuss sleep hygiene + sleep extension (averaging ~5-6 hours of sleep most night)     Disposition    Return to clinic in 12 months

## 2024-02-26 NOTE — PATIENT INSTRUCTIONS
[facility]  DO [address]  [address] [address]       NAME: Odilon Will   DATE: 02/26/24    Your Sleep Provider Today: Mary Grace Mercado PA-C  Your Primary Care Physician: Claribel Xavier, DO   Your Referring Provider: No ref. provider found    DIAGNOSIS:   1. KAHLIL on CPAP  Positive Airway Pressure (PAP) Therapy          Thank you for coming to the Sleep Medicine Clinic today! Your sleep medicine provider today was: Mary Grace Mercado PA-C Below is a summary of your treatment plan, other important information, and our contact numbers:      TREATMENT PLAN         Instructions - Common KAHLIL Recs: - For your sleep apnea, continue to use your PAP every night and use it whenever you are sleeping.   - Avoid alcohol or sedatives several hours prior to sleeping.   - Get additional supplies for your PAP (e.g., mask, hose, filters) every 3 months or as your insurance allows from your [employer] company. Replacement cushions for your PAP mask can be requested monthly if airseals are an issue.  - Remember to clean your mask, tubings, and water chamber regularly as instructed.  - Avoid driving or operating heavy machinery when drowsy. A person driving while sleepy is five (5) times more likely to have an accident. If you feel sleepy, pull over and take a short power nap (sleep for less than 30 minutes). Otherwise, ask somebody to drive you.        Follow-up Appointment:   1 year, sooner if needed       IMPORTANT INFORMATION     Call 911 for medical emergencies.  Our offices are generally open from Monday-Friday, 9 am - 5 pm.  If you need to get in touch with me, you may either call me and my team(number is below) or you can use [employer].  If a referral for a test, for CPAP, or for another specialist was made, and you have not heard about scheduling this within a week, please call scheduling at [phone] ([phone]).  If you are unable to make your appointment for  clinic or an overnight study, kindly call the office at least 48 hours in advance to cancel and reschedule.  If you are on CPAP, please bring your device's card or the device to each clinic appointment.   There are no supporting services by either the sleep doctors or their staff on weekends and Holidays, or after 5 PM on weekdays.   If you have been asked to come to a sleep study, make sure you bring toiletries, a comfy pillow, and any nighttime medications that you may regularly take. Also be sure to eat dinner before you arrive. We generally do not provide meals.      PRESCRIPTIONS     We require 7 days advanced notice for prescription refills. If we do not receive the request in this time, we cannot guarantee that your medication will be refilled in time.      IMPORTANT PHONE NUMBERS     Sleep Medicine Clinic Fax: [phone]  Appointments (for Adult Sleep Clinic): [phone] - option 2  Appointments (For Sleep Studies): [phone] - option 3  Behavioral Sleep Medicine: [phone]  Sleep Surgery: [phone]  ENT (Otolaryngology): [phone]  Headache Clinic (Neurology): [phone]  Neurology: [phone]  Psychiatry: [phone]  Pulmonary Function Testing (PFT) Center: [phone]  Pulmonary Medicine: [phone]  [employer] (DME): [phone]  [employer] (DME): [phone]  [facility] ([facility]): [phone]      OUR ADULT SLEEP MEDICINE TEAM   Please do not hesitate to call the office or sleep nurse with any questions between appointments:    Adult Sleep Nurses (Romelia Potts, RN and Melvina Mustafa RN):  For clinical questions and refilling prescriptions: [phone]  Email sleep diaries and other documents at: [email or web address]    Adult Sleep Medicine Secretaries:  Hyacinth Ellis (For [place]/Jamison/Krise/Strohl/Yeadam/Velarde):   P: [phone]  F: [phone]  Celine Son (For [place]): P: [phone]  Fax:  [phone]  Nella Kwok (For Jurcevic/Blank): P: [phone]  F: [phone]  Estrella Acharya (For Neo): P: [phone]  F: [phone]  Padma Hoffman (For May/Almita/Zakhary): P: [phone]  F: [phone]  Tonja Chaudhary (For Rene/Nikolai): P: [phone]  F: [phone]     Adult Sleep Medicine Advanced Practice Providers:  Kaushik Hernandez ([place], [place])  Sharon Recio ([facility])  Nadja Calzada CNP ([place], [place], [place])  Clary Mercado CNP ([place], [place], [place])  Radha Larson ([place], [place], [place])  Marie Menchaca CNP ([place], [place])        OUR SLEEP TESTING LOCATIONS     Our team will contact you to schedule your sleep study, however, you can contact us as follow:  Main Phone Line (scheduling only): [phone]), option 3  Adult and Pediatric Locations  [facility] (6 years and older): [facility] - 4th floor ([address] [place] [address]) After hours line: [phone]  [facility] (Main campus: All ages): [facility], 6th floor. After hours line: [phone]   [place] (5 years and older; younger considered on case-by-case basis): [address]; [address], [address]. Scheduling  After hours line: [phone]   [place] (6 years and older): [address]; [address]; [address]   [place] (6 years and older): [address] [facility], [address]  After hours line: [phone]   [place] (13 years and older) in [place]: [address], 2nd floor  After hours line: [phone]  [facility] (13 year and older): [address]  After hours line: [phone]     Adult Only Locations:   [place] (18 years and older): [address] [place] [address], 2nd floor   [place] (18 years and older): [address] [facility]; 4th floor  After hours line: [phone]  [facility] (18 years and older) at [place]: [id number] [place] [address]  After hours line:  "[phone]          CONTACTING YOUR SLEEP MEDICINE PROVIDER     Send a message directly to your provider through \"My Chart\", which is the email service through your  Records Account: [email or web address]   Call [phone] and leave a message. One of the administrative assistants will forward the message to your sleep medicine provider through \"My Chart\" and/or email.     Your sleep medicine provider for this visit was: Mary Grace Mercado PA-C    "

## 2024-03-05 ENCOUNTER — LAB (OUTPATIENT)
Dept: LAB | Facility: LAB | Age: 44
End: 2024-03-05
Payer: COMMERCIAL

## 2024-03-05 ENCOUNTER — OFFICE VISIT (OUTPATIENT)
Dept: PRIMARY CARE | Facility: CLINIC | Age: 44
End: 2024-03-05
Payer: COMMERCIAL

## 2024-03-05 VITALS
HEART RATE: 97 BPM | WEIGHT: 303.6 LBS | BODY MASS INDEX: 38.96 KG/M2 | OXYGEN SATURATION: 100 % | RESPIRATION RATE: 14 BRPM | HEIGHT: 74 IN | SYSTOLIC BLOOD PRESSURE: 118 MMHG | TEMPERATURE: 97.9 F | DIASTOLIC BLOOD PRESSURE: 68 MMHG

## 2024-03-05 DIAGNOSIS — E78.00 HIGH CHOLESTEROL: ICD-10-CM

## 2024-03-05 DIAGNOSIS — Z00.00 WELLNESS EXAMINATION: ICD-10-CM

## 2024-03-05 DIAGNOSIS — E66.01 CLASS 2 SEVERE OBESITY WITH SERIOUS COMORBIDITY AND BODY MASS INDEX (BMI) OF 37.0 TO 37.9 IN ADULT, UNSPECIFIED OBESITY TYPE (MULTI): ICD-10-CM

## 2024-03-05 DIAGNOSIS — Z12.5 SCREENING PSA (PROSTATE SPECIFIC ANTIGEN): ICD-10-CM

## 2024-03-05 DIAGNOSIS — Z00.00 WELLNESS EXAMINATION: Primary | ICD-10-CM

## 2024-03-05 DIAGNOSIS — G47.33 OSA ON CPAP: ICD-10-CM

## 2024-03-05 DIAGNOSIS — E66.01 CLASS 2 SEVERE OBESITY WITH SERIOUS COMORBIDITY AND BODY MASS INDEX (BMI) OF 38.0 TO 38.9 IN ADULT, UNSPECIFIED OBESITY TYPE (MULTI): ICD-10-CM

## 2024-03-05 DIAGNOSIS — J30.1 SEASONAL ALLERGIC RHINITIS DUE TO POLLEN: ICD-10-CM

## 2024-03-05 PROBLEM — R73.03 PREDIABETES: Status: ACTIVE | Noted: 2024-03-05

## 2024-03-05 PROBLEM — R09.81 NASAL CONGESTION: Status: ACTIVE | Noted: 2024-03-05

## 2024-03-05 PROBLEM — H10.45 CHRONIC ALLERGIC CONJUNCTIVITIS: Status: ACTIVE | Noted: 2024-03-05

## 2024-03-05 PROBLEM — R05.3 CHRONIC COUGH: Status: ACTIVE | Noted: 2024-03-05

## 2024-03-05 LAB
ALBUMIN SERPL BCP-MCNC: 4.3 G/DL (ref 3.4–5)
ALP SERPL-CCNC: 56 U/L (ref 33–120)
ALT SERPL W P-5'-P-CCNC: 13 U/L (ref 10–52)
ANION GAP SERPL CALC-SCNC: 9 MMOL/L (ref 10–20)
AST SERPL W P-5'-P-CCNC: 14 U/L (ref 9–39)
BASOPHILS # BLD AUTO: 0.04 X10*3/UL (ref 0–0.1)
BASOPHILS NFR BLD AUTO: 0.7 %
BILIRUB SERPL-MCNC: 0.5 MG/DL (ref 0–1.2)
BUN SERPL-MCNC: 15 MG/DL (ref 6–23)
CALCIUM SERPL-MCNC: 9.8 MG/DL (ref 8.6–10.6)
CHLORIDE SERPL-SCNC: 106 MMOL/L (ref 98–107)
CHOLEST SERPL-MCNC: 209 MG/DL (ref 0–199)
CHOLESTEROL/HDL RATIO: 3.2
CO2 SERPL-SCNC: 28 MMOL/L (ref 21–32)
CREAT SERPL-MCNC: 0.88 MG/DL (ref 0.5–1.3)
EGFRCR SERPLBLD CKD-EPI 2021: >90 ML/MIN/1.73M*2
EOSINOPHIL # BLD AUTO: 0.23 X10*3/UL (ref 0–0.7)
EOSINOPHIL NFR BLD AUTO: 3.8 %
ERYTHROCYTE [DISTWIDTH] IN BLOOD BY AUTOMATED COUNT: 13.9 % (ref 11.5–14.5)
EST. AVERAGE GLUCOSE BLD GHB EST-MCNC: 111 MG/DL
GLUCOSE SERPL-MCNC: 87 MG/DL (ref 74–99)
HBA1C MFR BLD: 5.5 %
HCT VFR BLD AUTO: 44 % (ref 41–52)
HDLC SERPL-MCNC: 65.9 MG/DL
HGB BLD-MCNC: 13.5 G/DL (ref 13.5–17.5)
IMM GRANULOCYTES # BLD AUTO: 0.01 X10*3/UL (ref 0–0.7)
IMM GRANULOCYTES NFR BLD AUTO: 0.2 % (ref 0–0.9)
LDLC SERPL CALC-MCNC: 133 MG/DL
LYMPHOCYTES # BLD AUTO: 2.36 X10*3/UL (ref 1.2–4.8)
LYMPHOCYTES NFR BLD AUTO: 38.5 %
MCH RBC QN AUTO: 26.7 PG (ref 26–34)
MCHC RBC AUTO-ENTMCNC: 30.7 G/DL (ref 32–36)
MCV RBC AUTO: 87 FL (ref 80–100)
MONOCYTES # BLD AUTO: 0.65 X10*3/UL (ref 0.1–1)
MONOCYTES NFR BLD AUTO: 10.6 %
NEUTROPHILS # BLD AUTO: 2.84 X10*3/UL (ref 1.2–7.7)
NEUTROPHILS NFR BLD AUTO: 46.2 %
NON HDL CHOLESTEROL: 143 MG/DL (ref 0–149)
NRBC BLD-RTO: 0 /100 WBCS (ref 0–0)
PLATELET # BLD AUTO: 195 X10*3/UL (ref 150–450)
POTASSIUM SERPL-SCNC: 4.3 MMOL/L (ref 3.5–5.3)
PROT SERPL-MCNC: 6.8 G/DL (ref 6.4–8.2)
PSA SERPL-MCNC: 0.98 NG/ML
RBC # BLD AUTO: 5.06 X10*6/UL (ref 4.5–5.9)
SODIUM SERPL-SCNC: 139 MMOL/L (ref 136–145)
TRIGL SERPL-MCNC: 52 MG/DL (ref 0–149)
TSH SERPL-ACNC: 1.39 MIU/L (ref 0.44–3.98)
VLDL: 10 MG/DL (ref 0–40)
WBC # BLD AUTO: 6.1 X10*3/UL (ref 4.4–11.3)

## 2024-03-05 PROCEDURE — 80061 LIPID PANEL: CPT

## 2024-03-05 PROCEDURE — 80053 COMPREHEN METABOLIC PANEL: CPT

## 2024-03-05 PROCEDURE — 85025 COMPLETE CBC W/AUTO DIFF WBC: CPT

## 2024-03-05 PROCEDURE — 99213 OFFICE O/P EST LOW 20 MIN: CPT | Performed by: FAMILY MEDICINE

## 2024-03-05 PROCEDURE — 83036 HEMOGLOBIN GLYCOSYLATED A1C: CPT

## 2024-03-05 PROCEDURE — 36415 COLL VENOUS BLD VENIPUNCTURE: CPT

## 2024-03-05 PROCEDURE — 1036F TOBACCO NON-USER: CPT | Performed by: FAMILY MEDICINE

## 2024-03-05 PROCEDURE — 84153 ASSAY OF PSA TOTAL: CPT

## 2024-03-05 PROCEDURE — 84443 ASSAY THYROID STIM HORMONE: CPT

## 2024-03-05 PROCEDURE — 3008F BODY MASS INDEX DOCD: CPT | Performed by: FAMILY MEDICINE

## 2024-03-05 ASSESSMENT — PATIENT HEALTH QUESTIONNAIRE - PHQ9
2. FEELING DOWN, DEPRESSED OR HOPELESS: NOT AT ALL
SUM OF ALL RESPONSES TO PHQ9 QUESTIONS 1 AND 2: 0
1. LITTLE INTEREST OR PLEASURE IN DOING THINGS: NOT AT ALL

## 2024-03-05 NOTE — PATIENT INSTRUCTIONS
1 - labs ordered- can get today  2 - colonoscopy 6/20/23- repeat in 10yrs  3- Continue to work on healthy diet and exercise  We encourage all patients to engage in exercise 150 minutes per week   Adults 18 and older, activity during each week - if you are able:    Engage in at least 150 minutes of moderate or vigorous exercise per week   If you are able- Engage in muscle strengthening activity on 2 or more days per week      Please follow up in 1 yr for wellness or as needed.       ** If labs or imaging ordered at today's visit, all the non-urgent results will be discussed at your next visit    If you have been referred for a special test or to a specialist please call  [phone] to schedule an appointment.  If you have any further questions, or if develop new or worsened symptoms, please give our office a call at [phone].

## 2024-03-05 NOTE — PROGRESS NOTES
"Subjective   Patient ID: GAMALIEL Will is a 44 y.o. male who presents for Annual Exam.  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 Pupils are symmetric.   Ears, Nose, Mouth, and Throat: External ears and nose appear to be without deformity or rash. No lesions or masses noted. Hearing is grossly intact.   CV: RRR, no murmur  Carotids: no bruits  Thyroid nml  Pulm:CTA B/L  Abd: soft, NTTP, + BS  LE: no edema  Psychiatric: Alert, orientation to person, place, and time. Recent/remote memory as evidenced through face-to-face interaction and discussion appear grossly intact. Mood and affect are normal.      Assessment/Plan   Problem List Items Addressed This Visit             ICD-10-CM    KAHLIL on CPAP  - continue use G47.33    High cholesterol  - checking labs E78.00    Seasonal allergic rhinitis due to pollen  - start allergy meds now  - let me " know if you would like to try allergy shots J30.1     Other Visit Diagnoses         Codes    Wellness examination    -  Primary  -colonoscopy 6/20/23- repeat in 10yrs Z00.00    Class 2 severe obesity with serious comorbidity and body mass index (BMI) of 38.0 to 38.9 in adult, unspecified obesity type (CMS/[facility])      Continue to work on healthy diet and exercise  We encourage all patients to engage in exercise 150 minutes per week   Adults 18 and older, activity during each week - if you are able:    Engage in at least 150 minutes of moderate or vigorous exercise per week   If you are able- Engage in muscle strengthening activity on 2 or more days per week   E66.01, Z68.38       Please follow up in 1 yr for wellness  or as needed.

## 2024-04-09 ENCOUNTER — APPOINTMENT (OUTPATIENT)
Dept: PRIMARY CARE | Facility: CLINIC | Age: 44
End: 2024-04-09
Payer: COMMERCIAL

## 2024-04-09 ENCOUNTER — TELEMEDICINE (OUTPATIENT)
Dept: PRIMARY CARE | Facility: CLINIC | Age: 44
End: 2024-04-09
Payer: COMMERCIAL

## 2024-04-09 DIAGNOSIS — S16.1XXA STRAIN OF NECK MUSCLE, INITIAL ENCOUNTER: ICD-10-CM

## 2024-04-09 DIAGNOSIS — M54.10 RADICULOPATHY AFFECTING UPPER EXTREMITY: Primary | ICD-10-CM

## 2024-04-09 PROCEDURE — 3008F BODY MASS INDEX DOCD: CPT | Performed by: FAMILY MEDICINE

## 2024-04-09 PROCEDURE — 99212 OFFICE O/P EST SF 10 MIN: CPT | Performed by: FAMILY MEDICINE

## 2024-04-09 RX ORDER — GABAPENTIN 100 MG/1
300 CAPSULE ORAL 3 TIMES DAILY
Qty: 270 CAPSULE | Refills: 0 | Status: SHIPPED | OUTPATIENT
Start: 2024-04-09 | End: 2024-05-09

## 2024-04-09 NOTE — PROGRESS NOTES
"I performed this visit using realtime telehealth tools, including an audio/video OR telephone connection between the patient listed who was located in the [facility] and myself, Ana Castaneda (Board certified in the [facility]).  At the start of the visit, I introduced myself as Dr. Rahman and verified the patients name, , and current physical location.    If they were currently outside of the state of [place], the visit was ended and the patient was referred to alternative means for evaluation and treatment.   The patient was made aware of the limitations of the telehealth visit.  They will not be physically examined and all issues may not be appropriate for a telehealth visit.  If necessary, an in person referral will be made.      All allergies were reviewed as well as reconciliation of all medications.       \"Pinched nerve in my neck/Shoulder area. Pain radiates and throbs from the top,  inner side of my left shoulder blade, covers the entire shoulder blade, and goes down my left arm to my elbow/triceps. It is a throbbing pain that gets intense at times...sometimes not allowing me to sit uninterrupted to complete work. My pain is relieved most with showers, laying flat, or using Voltarin (sp?) gel.\"    This pain started  1st week in March from sleeping on it \"crick in neck\" \"stiff neck\"! 4-5 weeks ago  Has not resolved as usually does in 1-2 days--was just a stiff neck to start  Central point is top medial side of left shoulder blade where neck goes into spine  Can feel like something is pulling on the nerve when stretches  Throbbing pain  Radiates through left shoulder to tricep of left arm  For the past week or so it has had this character  Relived when laying down for night for the most part  Except had one night recently where it throbbed overnight  When standing up in shower it helps  Wife (FM MD) thinks more of a pinched nerve  He used voltarin topical for past few days with some relief  Helps " tolerance of pain but not resolving pain  Does not go into chest    Last week it felt like neck had been jammed into spine--only 1 day--other symptoms were better but the jammed feeling went away --next day the jammed was better but other symptoms were back  No numbness or tingling into arm  Pain is a throb at medial tip of shoulder blade and throbs down arm  No loss of strength    This am when woke up --no pain  But when started moving the throbbing starts--   Has  CPAP-- lays on left shoulder or back--yesterday laying on left shoulder it was worse  Used to be better to lay on left side b/c if rolled to right side felt pulling of muscles--    Tried :  Voltaren gel and hot shower  Ibuprofen 800mg scheduled  Meloxicam just here and there--not daily or regularly  Tried muscle relaxer--helped initially but not since-- tizanidine--   Sits up straight so nerve is not pulled on and helps for a little bit--  Couch reclines-- feels pressure is relieved and pulling improves    Has better ROM for now  Chin to chest felt pull in right upper side   Decreased ROM to left and right--    For the next week no time to sleep--   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  Flying out Thursday night  Going to [place] this weekend    Had right elbow pain recently and Took meloxicam as needed for that--did not help much      Neck/Left Scapular pain with radiculopathy into upper arm  Discussed several different  options for treatment  He has used gabapentin in the past for this type of pain and has had good results-  1)  Gabapentin trial--short term--can make sleepy  vs  2) Meloxicam daily +/- muscle relaxant  vs  3) Medrol dose pack to help with radiculopathy--not supported in studies but can help sometimes in some ppl--discussed steroid adverse effects    Pt prefers gabapentin-- take as written-- follow up if worsens or persists  Heat +/- ice  Massotherapy  stretching

## 2024-04-15 ENCOUNTER — PATIENT MESSAGE (OUTPATIENT)
Dept: PRIMARY CARE | Facility: CLINIC | Age: 44
End: 2024-04-15
Payer: COMMERCIAL

## 2024-04-15 NOTE — PATIENT INSTRUCTIONS
Neck/Left Scapular pain with radiculopathy into upper arm  Discussed several different  options for treatment  He has used gabapentin in the past for this type of pain and has had good results-  1)  Gabapentin trial--short term--can make sleepy  vs  2) Meloxicam daily +/- muscle relaxant  vs  3) Medrol dose pack to help with radiculopathy--not supported in studies but can help sometimes in some ppl--discussed steroid adverse effects    Pt prefers gabapentin-- take as written-- follow up if worsens or persists  Heat +/- ice  Massotherapy  Stretching    Please send me a [employer] message if you have any questions or concerns.  FOR NON URGENT questions only.  Allow up to 72 hours for response.    If you have prescription issues or other questions you can email   Viral Mahoney  Digital Health Coordinator, at   [email or web address]     Rest and drink plenty of fluids    Tylenol and or motrin as needed for pain and fever (unless you have been told not to take these because of your personal medical history)    Discussed options and precautions (complaint specific and may include)  Viral versus bacterial infection; use of medications; possible side effects; appropriate over-the-counter medications; possible complications and /or when to follow-up.    Follow-up in 1 to 2 days if not improving.  Follow-up immediately if symptoms worsen.    All red flags requiring in person care were discussed.  All patient's questions were answered.    To connect with a new PCP please visit [email or web address] or call [phone]     If experiencing any severe or worsening symptoms including but not limited to lethargy / chest pain / weakness / dizziness / difficulty breathing please call [phone] or go to the emergency department for immediate care!    Limitations to telemedicine include inability to do a complete and accurate physical exam.  Any concerns regarding this were conveyed with the patient  and in person follow-up recommended if patient nature of illness does not progress as anticipated during this visit.

## 2024-04-22 ENCOUNTER — PATIENT MESSAGE (OUTPATIENT)
Dept: PRIMARY CARE | Facility: CLINIC | Age: 44
End: 2024-04-22

## 2024-04-22 DIAGNOSIS — M25.519 SHOULDER PAIN, UNSPECIFIED CHRONICITY, UNSPECIFIED LATERALITY: ICD-10-CM

## 2024-04-22 DIAGNOSIS — M54.9 ACUTE BACK PAIN, UNSPECIFIED BACK LOCATION, UNSPECIFIED BACK PAIN LATERALITY: Primary | ICD-10-CM

## 2024-04-22 DIAGNOSIS — B00.1 COLD SORE: ICD-10-CM

## 2024-04-22 DIAGNOSIS — M54.2 NECK PAIN: ICD-10-CM

## 2024-04-23 RX ORDER — VALACYCLOVIR HYDROCHLORIDE 1 G/1
2000 TABLET, FILM COATED ORAL 2 TIMES DAILY
Qty: 12 TABLET | Refills: 3 | Status: SHIPPED | OUTPATIENT
Start: 2024-04-23 | End: 2024-04-24

## 2024-05-16 NOTE — PROGRESS NOTES
Physical Therapy    Physical Therapy Evaluation & Treatment    Patient Name: GAMALIEL Will  MRN: [id number]  Today's Date: 5/17/24  Visit 1     Time In 12:30 pm  Time out 1.:15 PM  Assessment/Plan                 Subjective   Onset in neck early March 2024 secondary to sleeping wrong. Pain changed to posterior L upper arm . Today pain aggravated by going from sitting to stand pain shoots to L shoulder blade  No pain now  General  Preferred Learning Style: visual  Home Living:     Prior Level of Function:  Prior Function Per Pt/Caregiver Report  Hand Dominance: Right         Objective   Pain:     Cognition:   A+Ox3  Observation:  FWD head with scapular protraction  Shoulder joint clearance:     Cervical AROM : Flex   %    , Extension    %  ,   Sidebending  R  %  L  %  , Rotation  R  deg  L  deg all full      Myotomal Strength:  C4   R /5   L /5,  C5  R  /5  L  /5, C6  R  /5  L  /5,  C7   R  5/5  L  4/5  C8   R  /5  L  /5  T1   R  /5  L  /5    Shoulder Strength:  Abduction   R  /5  L  /5,   Extension  R   /5    L  /5 ,  Flexion R   /5   L  /5  ER  R  /5  L  /5  IR  R  /5  L  /5 all strong         Sensation:WNL  Segmental cervical  joint mobility: WNL   Palpation:  upper quarter hypertonus including L  UT , L anterior scalene,  ,                     Special Tests:   Spurling's   R   L     -  ,  Vertical compression    -,    Treatments:  Seated postural reset with 20 lb DB  Outcome Measures:  None   Goals  GAMALIEL Will will demo correct postural adjustments in sittinfg and standing to help maintain patent neuro foramina  AGMALIEL Will will be able to handle 40-50 UQ loading /lift/carry without aggravating familiar UQ symptom  independent Home exercise program

## 2024-05-17 ENCOUNTER — EVALUATION (OUTPATIENT)
Dept: PHYSICAL THERAPY | Facility: CLINIC | Age: 44
End: 2024-05-17
Payer: COMMERCIAL

## 2024-05-17 DIAGNOSIS — M25.519 SHOULDER PAIN, UNSPECIFIED CHRONICITY, UNSPECIFIED LATERALITY: ICD-10-CM

## 2024-05-17 DIAGNOSIS — M54.9 ACUTE BACK PAIN, UNSPECIFIED BACK LOCATION, UNSPECIFIED BACK PAIN LATERALITY: ICD-10-CM

## 2024-05-17 DIAGNOSIS — M54.2 NECK PAIN: Primary | ICD-10-CM

## 2024-05-17 PROCEDURE — 97110 THERAPEUTIC EXERCISES: CPT | Mod: GP | Performed by: PHYSICAL THERAPIST

## 2024-05-17 PROCEDURE — 97161 PT EVAL LOW COMPLEX 20 MIN: CPT | Mod: GP | Performed by: PHYSICAL THERAPIST

## 2024-05-30 ENCOUNTER — OFFICE VISIT (OUTPATIENT)
Dept: UROLOGY | Facility: HOSPITAL | Age: 44
End: 2024-05-30
Payer: COMMERCIAL

## 2024-05-30 DIAGNOSIS — R39.9 LOWER URINARY TRACT SYMPTOMS (LUTS): Primary | ICD-10-CM

## 2024-05-30 DIAGNOSIS — R31.0 GROSS HEMATURIA: ICD-10-CM

## 2024-05-30 PROCEDURE — 99214 OFFICE O/P EST MOD 30 MIN: CPT | Performed by: STUDENT IN AN ORGANIZED HEALTH CARE EDUCATION/TRAINING PROGRAM

## 2024-05-30 PROCEDURE — 3008F BODY MASS INDEX DOCD: CPT | Performed by: STUDENT IN AN ORGANIZED HEALTH CARE EDUCATION/TRAINING PROGRAM

## 2024-05-30 PROCEDURE — 99204 OFFICE O/P NEW MOD 45 MIN: CPT | Performed by: STUDENT IN AN ORGANIZED HEALTH CARE EDUCATION/TRAINING PROGRAM

## 2024-05-30 PROCEDURE — 1036F TOBACCO NON-USER: CPT | Performed by: STUDENT IN AN ORGANIZED HEALTH CARE EDUCATION/TRAINING PROGRAM

## 2024-05-30 PROCEDURE — 87086 URINE CULTURE/COLONY COUNT: CPT | Performed by: STUDENT IN AN ORGANIZED HEALTH CARE EDUCATION/TRAINING PROGRAM

## 2024-05-30 PROCEDURE — 88112 CYTOPATH CELL ENHANCE TECH: CPT | Mod: TC,MCY | Performed by: STUDENT IN AN ORGANIZED HEALTH CARE EDUCATION/TRAINING PROGRAM

## 2024-05-30 ASSESSMENT — PAIN SCALES - GENERAL: PAINLEVEL: 9

## 2024-05-30 NOTE — PROGRESS NOTES
Subjective   Patient ID: Odilon Will is a 44 y.o. male    HPI  44 y.o. male who presents today for concerns of blood in the urine, he noticed it few weeks ago, pt underwent colonoscopy on 6/20/2023.  Pt reports some dysuria which started this week, but denies frequency or urgency.    Review of Systems    All systems were reviewed. Anything negative was noted in the HPI.    Objective   Physical Exam  Genitourinary:     Pubic Area: No rash.       Penis: Normal and circumcised. No hypospadias.       Testes:         Right: Mass, tenderness, swelling, testicular hydrocele or varicocele not present. Right testis is descended.         Left: Mass, tenderness, swelling, testicular hydrocele or varicocele not present. Left testis is descended.      Epididymis:      Right: Not inflamed or enlarged. No mass or tenderness.      Left: Not inflamed or enlarged. No mass or tenderness.         General: Well developed, well nourished, alert and cooperative, appears in no acute distress   Eyes: Non-injected conjunctiva, sclera clear, no proptosis   Cardiac: Extremities are warm and well perfused. No edema, cyanosis or pallor   Lungs: Breathing is easy, non-labored. Speaking in clear and complete sentences. Normal diaphragmatic movement   MSK: Ambulatory with steady gait, unassisted   Neuro: Alert and oriented to person, place, and time   Psych: Demonstrates good judgment and reason, without hallucinations, abnormal affect or abnormal behaviors   Skin: No obvious lesions, no rashes       No CVA tenderness bilaterally   No suprapubic pain or discomfort       Past Medical History:   Diagnosis Date    Bicipital tendinitis, left shoulder 02/17/2021    Biceps tendinitis of left upper extremity    Cutaneous abscess, unspecified 09/11/2020    Phlegmon    Encounter for screening for diabetes mellitus 09/11/2020    Screening for diabetes mellitus    Encounter for screening for lipoid disorders 09/11/2020    Screening for hyperlipidemia     Encounter for screening for nutritional disorder 09/11/2020    Encounter for vitamin deficiency screening    Impingement syndrome of left shoulder 07/21/2021    Impingement syndrome of left shoulder    Morbid (severe) obesity due to excess calories (Multi) 12/29/2020    Morbid obesity with BMI of 40.0-44.9, adult    Other symptoms and signs involving the musculoskeletal system 04/30/2021    Shoulder weakness    Other tear of medial meniscus, current injury, right knee, initial encounter     Tear of medial meniscus of right knee    Pain in left shoulder 04/30/2021    Chronic left shoulder pain    Personal history of other specified conditions 12/18/2020    History of chronic cough    Sprain of other specified parts of left knee, initial encounter     Injury of meniscus of left knee    Stiffness of left shoulder, not elsewhere classified 04/30/2021    Decreased range of motion of left shoulder    Superior glenoid labrum lesion of unspecified shoulder, initial encounter 09/14/2020    SLAP tear of shoulder         No past surgical history on file.        Assessment/Plan   Gross hematuria    44 y.o. male who presents for the above condition, We had a very long and extensive discussion regarding gross hematuria. I explained to the patient the pathophysiology, differential diagnosis, risk factor, associated conditions, and management. We discussed the need to do gross hematuria work-up to rule out any underlying  malignancies in the form of  masses, tumor, polyps that might be causing the gross hematuria. We discussed at length the risk, benefit, potential complication, adverse events of cystoscopy, ultrasound kidneys, and urine cytology. Patient verbalized understanding and would like to proceed.      Plan:  - Serum creatinine  - Urine culture  - Urine cytology  - CT urography  - Cystoscopy in 1 month        5/30/2024    Deyaibe Attestation  By signing my name below, IDeshaun Scribe   attest that this  documentation has been prepared under the direction and in the presence of Dr. Isael Villalobos

## 2024-05-31 PROBLEM — M54.2 NECK PAIN: Status: ACTIVE | Noted: 2024-05-31

## 2024-05-31 PROBLEM — M25.519 SHOULDER PAIN: Status: ACTIVE | Noted: 2024-05-31

## 2024-05-31 LAB — BACTERIA UR CULT: NO GROWTH

## 2024-05-31 NOTE — PROGRESS NOTES
Physical Therapy    Physical Therapy Evaluation & Treatment    Patient Name: GAMALIEL Will  MRN: [id number]  Today's Date: 6/3/24  Visit 2     Time in: 0830  Time out: 0910  Assessment/Plan     GAMALIEL Will is doing very well . No UE radicular symptom and minimal neck pain that is occasional now. . We progressed him to UQ strength training and he is able  to perform exercise pain free.            Subjective   GAMALIEL Will notes that UE radicular symptom is abolished. No neck pain now and only minor neck pain occasionally             Pain:  0/10  Objective          Treatments:  Front walk 20 lb DB  Barr's walk (2) 20 lbs dbs  UBE 6 min alternate direction every 30 sec at 60 RPM  Standing T band low pull 2 x 20  Standing Tband high pull 2x20   Education  Access Code: [email or web address]  URL: [email or web address]  Date: 06/03/2024  Prepared by: Ollie Jacome    Exercises  - Shoulder extension with resistance - Neutral  - 1 x daily - 5 x weekly - 2 sets - 20 reps  - Standing Row with Anchored Resistance  - 1 x daily - 5 x weekly - 2 sets - 20 reps  - Farmer's Walk  - 1 x daily - 5 x weekly - 1 reps  Goals  GAMALIEL Will will demo correct postural adjustments in sittinfg and standing to help maintain patent neuro foramina  GAMALIEL Will will be able to handle 40-50 UQ loading /lift/carry without aggravating familiar UQ symptom  independent Home exercise program

## 2024-06-03 ENCOUNTER — TREATMENT (OUTPATIENT)
Dept: PHYSICAL THERAPY | Facility: CLINIC | Age: 44
End: 2024-06-03
Payer: COMMERCIAL

## 2024-06-03 DIAGNOSIS — M25.519 SHOULDER PAIN, UNSPECIFIED CHRONICITY, UNSPECIFIED LATERALITY: Primary | ICD-10-CM

## 2024-06-03 DIAGNOSIS — M54.2 NECK PAIN: ICD-10-CM

## 2024-06-03 LAB
LABORATORY COMMENT REPORT: NORMAL
LABORATORY COMMENT REPORT: NORMAL
PATH REPORT.FINAL DX SPEC: NORMAL
PATH REPORT.GROSS SPEC: NORMAL
PATH REPORT.RELEVANT HX SPEC: NORMAL
PATH REPORT.TOTAL CANCER: NORMAL

## 2024-06-03 PROCEDURE — 97110 THERAPEUTIC EXERCISES: CPT | Mod: GP | Performed by: PHYSICAL THERAPIST

## 2024-06-06 ENCOUNTER — HOSPITAL ENCOUNTER (OUTPATIENT)
Dept: RADIOLOGY | Facility: HOSPITAL | Age: 44
Discharge: HOME | End: 2024-06-06
Payer: COMMERCIAL

## 2024-06-06 DIAGNOSIS — R31.0 GROSS HEMATURIA: ICD-10-CM

## 2024-06-07 ENCOUNTER — LAB (OUTPATIENT)
Dept: LAB | Facility: LAB | Age: 44
End: 2024-06-07
Payer: COMMERCIAL

## 2024-06-07 ENCOUNTER — HOSPITAL ENCOUNTER (OUTPATIENT)
Dept: RADIOLOGY | Facility: HOSPITAL | Age: 44
Discharge: HOME | End: 2024-06-07
Payer: COMMERCIAL

## 2024-06-07 DIAGNOSIS — R31.0 GROSS HEMATURIA: ICD-10-CM

## 2024-06-07 LAB
CREAT SERPL-MCNC: 0.98 MG/DL (ref 0.5–1.3)
EGFRCR SERPLBLD CKD-EPI 2021: >90 ML/MIN/1.73M*2

## 2024-06-07 PROCEDURE — 36415 COLL VENOUS BLD VENIPUNCTURE: CPT

## 2024-06-07 PROCEDURE — 74178 CT ABD&PLV WO CNTR FLWD CNTR: CPT | Performed by: RADIOLOGY

## 2024-06-07 PROCEDURE — 82565 ASSAY OF CREATININE: CPT

## 2024-06-07 PROCEDURE — 2550000001 HC RX 255 CONTRASTS: Performed by: STUDENT IN AN ORGANIZED HEALTH CARE EDUCATION/TRAINING PROGRAM

## 2024-06-07 PROCEDURE — 76377 3D RENDER W/INTRP POSTPROCES: CPT | Performed by: RADIOLOGY

## 2024-06-07 PROCEDURE — 76377 3D RENDER W/INTRP POSTPROCES: CPT

## 2024-06-07 RX ADMIN — IOHEXOL 75 ML: 350 INJECTION, SOLUTION INTRAVENOUS at 10:08

## 2024-06-18 ENCOUNTER — APPOINTMENT (OUTPATIENT)
Dept: UROLOGY | Facility: HOSPITAL | Age: 44
End: 2024-06-18
Payer: COMMERCIAL

## 2024-07-01 NOTE — PROGRESS NOTES
Physical Therapy    Physical Therapy Evaluation & Treatment    Patient Name: GAMALIEL Will  MRN: [id number]  Today's Date: 7/2/24  Visit 3     Time in: 0830  Time out: 0910  Assessment/Plan     GAMALIEL Will is doing very well . No UE radicular symptom and minimal neck pain that is occasional now. . We progressed him to UQ strength training and he is able  to perform exercise pain free.            Subjective   GAMALIEL Will notes that UE radicular symptom is abolished. No neck pain now and only minor neck pain occasionally             Pain:  0/10  Objective          Treatments:  Front walk 20 lb DB  Barr's walk (2) 20 lbs dbs  UBE 6 min alternate direction every 30 sec at 60 RPM  Standing T band low pull 2 x 20  Standing Tband high pull 2x20   Education  Access Code: [email or web address]  URL: [email or web address]  Date: 06/03/2024  Prepared by: Ollie Jacome    Exercises  - Shoulder extension with resistance - Neutral  - 1 x daily - 5 x weekly - 2 sets - 20 reps  - Standing Row with Anchored Resistance  - 1 x daily - 5 x weekly - 2 sets - 20 reps  - Farmer's Walk  - 1 x daily - 5 x weekly - 1 reps  Goals  GAMALIEL Will will demo correct postural adjustments in sittinfg and standing to help maintain patent neuro foramina  GAMALIEL Will will be able to handle 40-50 UQ loading /lift/carry without aggravating familiar UQ symptom  independent Home exercise program

## 2024-07-02 ENCOUNTER — APPOINTMENT (OUTPATIENT)
Dept: PHYSICAL THERAPY | Facility: CLINIC | Age: 44
End: 2024-07-02
Payer: COMMERCIAL

## 2024-07-22 NOTE — PROGRESS NOTES
Physical Therapy    Physical Therapy Evaluation & Treatment    Patient Name: GAMALIEL Will  MRN: [id number]  Today's Date: 24  Visit 3       Assessment/Plan     GAMALIEL Will  has achieved the golas of skilled PT and has been pain free and back to his normal activity level for the nlast 2 weeks.        Plan   Physical Therapy    Discharge Summary    Name: Gamaliel Will  MRN: [id number]  : 1980  Date: 24    Discharge Summary: PT    Discharge Information: Date of discharge 24, Date of last visit 24, Date of evaluation 24, and Number of attended visits 3    Therapy Summary: see objective and assessment summary    Discharge Status: see objective and assessment summary     Rehab Discharge Reason: Achieved all and/or the most significant goals(s)    Subjective   GAMALIEL Will notes that UE radicular symptom is abolished still . No pain and GAMALIEL Will has been back to strength training           Pain:  0/10  Objective   Palpation:   Bilat UQ muscle tone WNL  Neck AROM  full pain free  Shoulder MMT flex strong pain free         Treatments:  Front walk 30 lb DB  Barr's walk (2) 20 lbs dbs  Bent over  1 arm row 30 lbs 2x 12  Advised against long lever arm loaded shoulder flexion and abduction  45 deg angle belly slap plank for pushing strength  Education  HEP review as above        Goals  GAMALIEL Will will demo correct postural adjustments in sittinfg and standing to help maintain patent neuro foraminaA  GAMALIEL Will will be able to handle 40-50 UQ loading /lift/carry without aggravating familiar UQ symptomA  independent Home exercise program A

## 2024-07-23 ENCOUNTER — TREATMENT (OUTPATIENT)
Dept: PHYSICAL THERAPY | Facility: CLINIC | Age: 44
End: 2024-07-23
Payer: COMMERCIAL

## 2024-07-23 DIAGNOSIS — M25.519 SHOULDER PAIN, UNSPECIFIED CHRONICITY, UNSPECIFIED LATERALITY: Primary | ICD-10-CM

## 2024-07-23 DIAGNOSIS — M54.2 NECK PAIN: ICD-10-CM

## 2024-07-23 DIAGNOSIS — M54.9 ACUTE BACK PAIN, UNSPECIFIED BACK LOCATION, UNSPECIFIED BACK PAIN LATERALITY: ICD-10-CM

## 2024-07-23 PROCEDURE — 97110 THERAPEUTIC EXERCISES: CPT | Mod: GP | Performed by: PHYSICAL THERAPIST

## 2024-12-27 ENCOUNTER — OFFICE VISIT (OUTPATIENT)
Dept: ORTHOPEDIC SURGERY | Facility: HOSPITAL | Age: 44
End: 2024-12-27
Payer: COMMERCIAL

## 2024-12-27 ENCOUNTER — HOSPITAL ENCOUNTER (OUTPATIENT)
Dept: RADIOLOGY | Facility: HOSPITAL | Age: 44
Discharge: HOME | End: 2024-12-27
Payer: COMMERCIAL

## 2024-12-27 DIAGNOSIS — M76.60 ACHILLES TENDON PAIN: Primary | ICD-10-CM

## 2024-12-27 DIAGNOSIS — S86.012A STRAIN OF LEFT ACHILLES TENDON, INITIAL ENCOUNTER: ICD-10-CM

## 2024-12-27 DIAGNOSIS — S39.012A LOW BACK STRAIN, INITIAL ENCOUNTER: Primary | ICD-10-CM

## 2024-12-27 DIAGNOSIS — M76.60 ACHILLES TENDON PAIN: ICD-10-CM

## 2024-12-27 PROCEDURE — 99213 OFFICE O/P EST LOW 20 MIN: CPT | Performed by: PHYSICIAN ASSISTANT

## 2024-12-27 PROCEDURE — 73610 X-RAY EXAM OF ANKLE: CPT | Mod: LT

## 2024-12-27 PROCEDURE — L4361 PNEUMA/VAC WALK BOOT PRE OTS: HCPCS | Performed by: PHYSICIAN ASSISTANT

## 2024-12-27 RX ORDER — DICLOFENAC SODIUM 10 MG/G
GEL TOPICAL
Qty: 100 G | Refills: 2 | Status: SHIPPED | OUTPATIENT
Start: 2024-12-27

## 2024-12-31 NOTE — PROGRESS NOTES
"HPI:  Odilon Will is a 44 y.o. male who presents to the [facility] for evaluation of left achilles pain.     Reports he was walking through a basketball drill with his daughter's team when he stepped back to plant his left foot and felt acute pain. Denies feeling/hearing a \"pop\" however has had pain and swelling of the left ankle/at the insertion of the achilles since then. Pain increased with weight bearing. He is able to ambulate but feels very unsteady. Denies numbness/tingling.     ROS:  Reviewed on EMR and patient intake sheet.    PMH/SH:  Reviewed on EMR and patient intake sheet.    Exam:  Physical Exam  Foot/Ankle Musculoskeletal Exam  Gait    Antalgic: left    Ambulation assistive devices: none.    Inspection    Left      Erythema: none        Effusion: none        Edema: mild        Ecchymosis: none        Deformity: none      Palpation    Left       Increased warmth: none        Masses: none        Crepitus: none        Tenderness: present          Achilles tendon: moderate      Range of Motion    Left      Left foot/ankle range of motion is full.      Strength    Left      Left foot/ankle strength is normal.     Neurovascular    Left      Left foot/ankle neurovascular exam is normal.      Special Tests    Left      Talar tilt stress test: negative        Yoon's test: negative        Single heel rise: with pain        Double heel rise: with pain      General      Constitutional: well-nourished    Psychiatric: normal mood and affect and no acute distress    Neurological: alert and oriented x3    Skin: intact      Radiology:     Xrays left ankle done in the office today 12/27/24 personally reviewed along with the accompanying rad report when available.     Interpreted By:  Arsen Greenwood,   STUDY:  XR ANKLE LEFT 3+ VIEWS; ;  12/27/2024 1:21 pm      INDICATION:  Signs/Symptoms:ankle pain.      ,M76.60 Achilles tendinitis, unspecified leg      COMPARISON:  None.      ACCESSION NUMBER(S):  [id number]    "   ORDERING CLINICIAN:  VI WASHINGTON      FINDINGS:  Three views of the left ankle.      There are productive changes about the distal tibiofibular  syndesmosis, likely reflecting remote trauma. Moderate  naviculocuneiform degenerative change. Dorsal and plantar calcaneal  enthesophytes. The soft tissues are unremarkable. No acute fracture.  No dislocation.      IMPRESSION:  Remote posttraumatic changes to the distal tibiofibular syndesmosis.      Moderate midfoot osteoarthrosis.      Dorsal and plantar calcaneal enthesophyte.      MACRO:  None      Signed by: Arsen Greenwood 12/30/2024 6:25 PM  Dictation workstation:   [id number]      Assessment and Plan:  1. Achilles tendon pain (Primary)  2. Strain of left Achilles tendon, initial encounter  - XR ankle left 3+ views; Future  - Walking Boot Tall  - Referral to Physical Therapy; Future  - diclofenac sodium (Voltaren) 1 % gel; Apply topically 4x daily prn for pain  Dispense: 100 g; Refill: 2    I reviewed the xray images in detail with the patient today. Patient with acute achilles sprain.     He was provided a tall walking boot today for comfort. We discussed transitioning out of the boot as tolerated as pain improves. He was instructed on ROM and stretching exercises in the meantime to prevent stiffness.     Prescriptions for Meloxicam and Topical Voltaren provided today along with referral to PT.     If pain persists despite the above recommend follow up with my colleague, Florentino Cuba PA-C.    Patient in agreement to plan of care. Of course Odilon Will is welcome to call at anytime with questions or concerns.     Vi Washington PA-C  Department of Orthopaedic Surgery    [address] [place] [address]    Voicemail: [phone]   Appts: [phone]  Fax: [phone]

## 2025-01-28 ENCOUNTER — EVALUATION (OUTPATIENT)
Dept: PHYSICAL THERAPY | Facility: CLINIC | Age: 45
End: 2025-01-28
Payer: COMMERCIAL

## 2025-01-28 DIAGNOSIS — M76.60 ACHILLES TENDON PAIN: Primary | ICD-10-CM

## 2025-01-28 DIAGNOSIS — S86.012D STRAIN OF LEFT ACHILLES TENDON, SUBSEQUENT ENCOUNTER: ICD-10-CM

## 2025-01-28 PROCEDURE — 97110 THERAPEUTIC EXERCISES: CPT | Mod: GP | Performed by: PHYSICAL THERAPIST

## 2025-01-28 PROCEDURE — 97161 PT EVAL LOW COMPLEX 20 MIN: CPT | Mod: GP | Performed by: PHYSICAL THERAPIST

## 2025-01-28 ASSESSMENT — ENCOUNTER SYMPTOMS
LOSS OF SENSATION IN FEET: 0
OCCASIONAL FEELINGS OF UNSTEADINESS: 0
DEPRESSION: 0

## 2025-01-28 NOTE — PROGRESS NOTES
Physical Therapy    Physical Therapy Evaluation and Treatment      Patient Name: Odilon Will  MRN: [id number]  Today's Date: 1/28/2025  Time Entry:   Time Calculation  Start Time: 0815  Stop Time: 0900  Time Calculation (min): 45 min  PT Evaluation Time Entry  PT Evaluation (Low) Time Entry: 30  PT Therapeutic Procedures Time Entry  Therapeutic Exercise Time Entry: 10     Insurance: [place], $35 copay, 50 PT/OT/SP, no auth  Visit #1    Assessment:  Odilon is a 44 y.o. male presenting 1 month after injuring L Achilles tendon. Exam shows TTP at Achilles insertion, mild calf weakness, decreased length in gastrocsoleus complex and gait impairments. 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 Pt would benefit from skilled PT to address the listed impairments and reduce pain to restore prior activity level.    Plan:  OP PT Plan  Treatment/Interventions: Therapeutic exercises, Manual therapy, Dry needling, Cryotherapy, Hot pack, Education/ Instruction  PT Plan: Skilled PT  PT Frequency:  (1x/week to every other week)  Duration: 6-8 visits  Onset Date: 12/27/24  Number of Treatments Authorized: 50 PT/OT/SP  Rehab Potential: Excellent  Plan of Care Agreement: Patient      Problem List Items Addressed This Visit    None  Visit Diagnoses         Codes    Achilles tendon pain    -  Primary M76.60    Relevant Orders    Follow Up In Physical Therapy    Strain of left Achilles tendon, subsequent encounter     S86.012D    Relevant Orders    Follow Up In Physical Therapy            General:  Reason for Referral: L Achilles strain  Referred By: Vi Washington PA-C  Preferred Learning Style: verbal, visual, written    Subjective   Odilon presents 1 month after injuring L Achilles. He was doing a chest pass during his daughter's basketball practice, stepped back and planted foot, and felt sharp pain. He denies feeling a pop. He went to ortho walk-in clinic  and had x-rays which showed “Remote posttraumatic changes to the distal tibiofibular syndesmosis. Moderate midfoot osteoarthrosis. Dorsal and plantar calcaneal enthesophyte.” He was placed in a walking boot which he stopped wearing ~2 weeks ago. He has been resting, not taking any NSAIDs or icing. 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 He denies any swelling or n/t. He denies prior h/o Achilles injury. PMHx: B knee arthroscopy, L shoulder surgery    Pt Goals: “Get back on my bike as soon as possible.”     Precautions:  Precautions  STEADI Fall Risk Score (The score of 4 or more indicates an increased risk of falling): 0    Pain:  L Achilles 0-1/10 currently, 4-5/10 at worst, “dull throb”    Prior Level of Function:  Independent in all activities. 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 Daily cycling for exercise.    Objective   Palpation: +TTP L Achilles insertion    Observation: B toeing out, B calcaneal varus    Gait: decreased L LE stance time, decreased R step length    Single leg balance: 30 seconds bilat    Ankle AROM (R/L in degrees):   Dorsiflexion- 0 / 0  Plantarflexion- 60 / 60  Inversion- 26 / 26  Eversion- 20 / 20    Ankle/Foot MMT (R/L):   Tibialis anterior- 5/5; 5/5  Tibialis posterior- 4+/5; 4+/5  Peroneals- 5/5; 5/5  Soleus- 4+/5; 4+/5  Gastrocs- 5/5; 5/5 *very mild pain    LE MMT (R/L):   Iliopsoas- 4/5; 4/5  Hip Abd- 4+/5; 4+/5  Glute max- 4+/5; 4+/5  Quadriceps- 5/5; 5/5  Hamstrings- 4+/5; 4+/5    Length tests (R/L in degrees):   Gastrocs- 0 / 0  Soleus- 0 / 0  90/90 hamstrings- (30) / (35)    Special tests:   (-) Yoon test    Outcome Measures:  LEFS: 47/80    Treatments:  Therapeutic Exercise:  Long sitting towel calf stretch 3x30 sec L  Long sitting ankle PF, black band, x 20 L  Calf raises 2x10    EDUCATION:  Access Code: [email or web address]  URL:  [email or web address]  Date: 01/28/2025  Prepared by: Latrell Barr    Exercises  - Long Sitting Calf Stretch with Strap  - 2 x daily - 7 x weekly - 3 reps - 30 seconds hold  - Long Sitting Ankle Plantar Flexion with Resistance  - 2 x daily - 7 x weekly - 1 sets - 20 reps  - Heel Raises with Counter Support  - 2 x daily - 7 x weekly - 2 sets - 10 reps    Goals:  Active       PT Problem       PT Goal 1       Start:  01/28/25    Expected End:  04/01/25       Increase R gastrocsoleus MMT to 5/5 without pain.         PT Goal 2       Start:  01/28/25    Expected End:  04/01/25       Increase B gastrocsoleus length by at least 5 degrees.         PT Goal 3       Start:  01/28/25    Expected End:  04/01/25       Pt will amb with proper heel strike/toe off pattern and equal stance time and step length.         PT Goal 4       Start:  01/28/25    Expected End:  04/01/25       Pt will report 0/10 L Achilles pain to improve walking, stair negotiation, exercising and coaching.         PT Goal 5       Start:  01/28/25    Expected End:  04/01/25       Improve LEFS by at least 9 points (MDIC).         PT Goal 6       Start:  01/28/25    Expected End:  04/01/25       Pt will demonstrate independence with HEP.

## 2025-02-14 ENCOUNTER — DOCUMENTATION (OUTPATIENT)
Dept: PHYSICAL THERAPY | Facility: CLINIC | Age: 45
End: 2025-02-14
Payer: COMMERCIAL

## 2025-02-14 NOTE — PROGRESS NOTES
Physical Therapy                 Therapy Communication Note    Patient Name: Odilon Will  MRN: [id number]  Department:   Room: Room/bed info not found  Today's Date: 2/14/2025     Discipline: Physical Therapy    Missed Time: No Show

## 2025-02-17 ENCOUNTER — APPOINTMENT (OUTPATIENT)
Dept: PHYSICAL THERAPY | Facility: CLINIC | Age: 45
End: 2025-02-17
Payer: COMMERCIAL

## 2025-02-17 ENCOUNTER — DOCUMENTATION (OUTPATIENT)
Dept: PHYSICAL THERAPY | Facility: CLINIC | Age: 45
End: 2025-02-17
Payer: COMMERCIAL

## 2025-02-17 NOTE — PROGRESS NOTES
Physical Therapy                 Therapy Communication Note    Patient Name: Odilon Will  MRN: [id number]  Department:   Room: Room/bed info not found  Today's Date: 2/17/2025     Discipline: Physical Therapy    Missed Time: Cancel    Comment:

## 2025-02-24 ENCOUNTER — APPOINTMENT (OUTPATIENT)
Dept: SLEEP MEDICINE | Facility: CLINIC | Age: 45
End: 2025-02-24
Payer: COMMERCIAL

## 2025-02-24 VITALS
WEIGHT: 308 LBS | OXYGEN SATURATION: 95 % | DIASTOLIC BLOOD PRESSURE: 86 MMHG | BODY MASS INDEX: 39.54 KG/M2 | TEMPERATURE: 97.1 F | HEART RATE: 70 BPM | SYSTOLIC BLOOD PRESSURE: 127 MMHG

## 2025-02-24 DIAGNOSIS — G47.33 OSA ON CPAP: Primary | ICD-10-CM

## 2025-02-24 PROCEDURE — 99213 OFFICE O/P EST LOW 20 MIN: CPT | Performed by: PHYSICIAN ASSISTANT

## 2025-02-24 ASSESSMENT — PAIN SCALES - GENERAL: PAINLEVEL_OUTOF10: 0-NO PAIN

## 2025-02-24 NOTE — ASSESSMENT & PLAN NOTE
-meeting compliance, experiencing good benefit from pap therapy  -update supply order with [facility]  -avoid drowsy driving

## 2025-02-24 NOTE — PROGRESS NOTES
Patient: Odilon Will    [id number]  : 1980 -- AGE 44 y.o.    Provider: Mary Grace Mercado PA-C     Location [facility]   Service Date: 2025              [facility]  Followup Visit Note    HISTORY OF PRESENT ILLNESS     HISTORY OF PRESENT ILLNESS   Odilon Will is a 44 y.o. male with h/o KAHLIL  who presents to a [facility] for followup.     Past Sleep History  Has been on CPAP since ~  Most recent sleep study in 2021 - indicates severe sleep apnea      Assessment and plan from last visit: 2024--   KAHLIL on CPAP - Primary G47.33        -update supply order  -continue current settings  -avoid drowsy driving            Relevant Orders     Positive Airway Pressure (PAP) Therapy      We also did discuss sleep hygiene + sleep extension (averaging ~5-6 hours of sleep most night)           Current History    On today's visit, the patient reports here for annual follow up visit, needs a new supply order. Using cpap regularly without issue. Uses N30 mask. Continues to experience benefit from cpap therapy.   Denies any additional sleep related issues.         Sleep schedule:   Bed time: 11:30P   Sleep latency: uses phone in bed for a bit or TV, falls asleep often by Mn or before  Nocturnal Awakenings: occasional, nocturia; generally able to fall asleep unless under a lot of stress   Wake up: 6:15A  TST:  6 hours      Naps: none    ESS: 8   TOSHA:  4  FOSQ: 40    REVIEW OF SYSTEMS     REVIEW OF SYSTEMS  See HPI; all other ROS were reviewed and negative for compliant      ALLERGIES AND MEDICATIONS     ALLERGIES  Allergies   Allergen Reactions    Pollen Extracts Itching and Other     Watery eyes       MEDICATIONS: He has a current medication list which includes the following prescription(s): albuterol - Inhale, diclofenac sodium - Apply topically 4x daily prn for pain, xhance - Administer 93 mcg into affected nostril(s) in the morning  and 93 mcg before bedtime, multivitamin - Take 1 tablet by mouth once daily, and montelukast - Take 1 tablet (10 mg) by mouth once daily at bedtime.    PAST MEDICAL HISTORY : He  has a past medical history of Bicipital tendinitis, left shoulder (02/17/2021), Cutaneous abscess, unspecified (09/11/2020), Encounter for screening for diabetes mellitus (09/11/2020), Encounter for screening for lipoid disorders (09/11/2020), Encounter for screening for nutritional disorder (09/11/2020), Impingement syndrome of left shoulder (07/21/2021), Morbid (severe) obesity due to excess calories (Multi) (12/29/2020), Other symptoms and signs involving the musculoskeletal system (04/30/2021), Other tear of medial meniscus, current injury, right knee, initial encounter, Pain in left shoulder (04/30/2021), Personal history of other specified conditions (12/18/2020), Sprain of other specified parts of left knee, initial encounter, Stiffness of left shoulder, not elsewhere classified (04/30/2021), and Superior glenoid labrum lesion of unspecified shoulder, initial encounter (09/14/2020).    PAST SURGICAL HISTORY: He  has no past surgical history on file.     FAMILY HISTORY: No changes since previous visit. Otherwise non-contributory as charted.     SOCIAL HISTORY  He  reports that he has never smoked. He has never used smokeless tobacco. He reports that he does not drink alcohol and does not use drugs.       PHYSICAL EXAM     VITAL SIGNS: /86   Pulse 70   Temp 36.2 °C (97.1 °F) (Temporal)   Wt 140 kg (308 lb)   SpO2 95%   BMI 39.54 kg/m²        PREVIOUS WEIGHTS:  Wt Readings from Last 3 Encounters:   02/24/25 140 kg (308 lb)   03/05/24 138 kg (303 lb 9.6 oz)   02/26/24 141 kg (310 lb)       Constitutional: Alert and oriented, cooperative, no acute distress  Head: Normocephalic, atraumatic   Cranial Features: No abnormal craniofacial features  Neck: Supple. Trachea midline.  Pulmonary: Non-labored breathing, speaks in full  sentences. No cough.    Cardiac: regular rate   Extremities: No clubbing, no edema  Neuromuscular: Cranial nerves grossly intact, no focal deficits      RESULTS/DATA     Bicarbonate (mmol/L)   Date Value   03/05/2024 28   04/25/2023 31   03/09/2022 31   09/11/2020 28       PAP Adherence  A PAP adherence download was obtained and data was reviewed personally today in clinic.        ASSESSMENT/PLAN     Mr. Will is a 44 y.o. male and he returns in followup to the [facility] for KAHLIL.    Problem List, Orders, Assessment, Recommendations:  Problem List Items Addressed This Visit             ICD-10-CM    KAHLIL on CPAP - Primary G47.33     -meeting compliance, experiencing good benefit from pap therapy  -update supply order with [facility]  -avoid drowsy driving            Relevant Orders    Positive Airway Pressure (PAP) Therapy               Disposition    Return to clinic in 12 months

## 2025-02-26 ENCOUNTER — TREATMENT (OUTPATIENT)
Dept: PHYSICAL THERAPY | Facility: CLINIC | Age: 45
End: 2025-02-26
Payer: COMMERCIAL

## 2025-02-26 DIAGNOSIS — M76.60 ACHILLES TENDON PAIN: ICD-10-CM

## 2025-02-26 DIAGNOSIS — S86.012D STRAIN OF LEFT ACHILLES TENDON, SUBSEQUENT ENCOUNTER: ICD-10-CM

## 2025-02-26 PROCEDURE — 97140 MANUAL THERAPY 1/> REGIONS: CPT | Mod: GP | Performed by: PHYSICAL THERAPIST

## 2025-02-26 PROCEDURE — 97110 THERAPEUTIC EXERCISES: CPT | Mod: GP | Performed by: PHYSICAL THERAPIST

## 2025-02-26 NOTE — PROGRESS NOTES
"Physical Therapy    Physical Therapy Treatment    Patient Name: Odilon Will  MRN: [id number]  Today's Date: 2/26/2025  Time Entry:   Time Calculation  Start Time: 1415  Stop Time: 1500  Time Calculation (min): 45 min     PT Therapeutic Procedures Time Entry  Manual Therapy Time Entry: 15  Therapeutic Exercise Time Entry: 25     Insurance: Rafael Gonzalez, $35 copay, 50 PT/OT/SP, no auth  Visit #2    Assessment:  Decreased Achilles stiffness following IASTM and calf stretching/strengthening. Progressed to eccentric calf raises without painful response.    Plan:  Continue IASTM, calf stretching and progressive tendon loading program.    Problem List Items Addressed This Visit    None  Visit Diagnoses         Codes    Achilles tendon pain     M76.60    Strain of left Achilles tendon, subsequent encounter     S86.012D            Subjective   \"It's good at some points, hasn't been bad. Moments during the day I'm walking fine. After long drives it feels stiff. Most days I do the exercises once per day.\" Pt states he's working back into [employer] workouts without pain.    Precautions  STEADI Fall Risk Score (The score of 4 or more indicates an increased risk of falling): 0    Pain  L Achilles 4/10 getting out of car in the parking lot, 1/10 currently    Objective   From Eval:  Length tests (R/L in degrees):   Gastrocs- 0 / 0  Soleus- 0 / 0  90/90 hamstrings- (30) / (35)    Treatments:  Manual Therapy:   IASTM to L calf and Achilles passive and active    Therapeutic Exercise:  Upright bike x 5 mins  Long sitting towel calf stretch 3x30 sec L  Long sitting ankle PF, black band, x 20 L  Eccentric calf raises 2x10  Seated calf raises 55# x 30  Standing calf stretch 3x30 sec L    OP EDUCATION:  Access Code: [email or web address]  URL: [email or web address]  Date: 02/26/2025  Prepared by: Latrell Barr    Exercises  - Standing Gastroc Stretch  - 1-2 x daily - 7 x weekly - 3 reps - 30 seconds hold    Goals:  Active       PT " Problem       PT Goal 1       Start:  01/28/25    Expected End:  04/01/25       Increase R gastrocsoleus MMT to 5/5 without pain.         PT Goal 2       Start:  01/28/25    Expected End:  04/01/25       Increase B gastrocsoleus length by at least 5 degrees.         PT Goal 3       Start:  01/28/25    Expected End:  04/01/25       Pt will amb with proper heel strike/toe off pattern and equal stance time and step length.         PT Goal 4       Start:  01/28/25    Expected End:  04/01/25       Pt will report 0/10 L Achilles pain to improve walking, stair negotiation, exercising and coaching.         PT Goal 5       Start:  01/28/25    Expected End:  04/01/25       Improve LEFS by at least 9 points (MDIC).         PT Goal 6       Start:  01/28/25    Expected End:  04/01/25       Pt will demonstrate independence with HEP.

## 2025-03-05 ENCOUNTER — APPOINTMENT (OUTPATIENT)
Dept: PRIMARY CARE | Facility: CLINIC | Age: 45
End: 2025-03-05
Payer: COMMERCIAL

## 2025-03-05 ENCOUNTER — DOCUMENTATION (OUTPATIENT)
Dept: PHYSICAL THERAPY | Facility: CLINIC | Age: 45
End: 2025-03-05

## 2025-03-05 VITALS
SYSTOLIC BLOOD PRESSURE: 115 MMHG | DIASTOLIC BLOOD PRESSURE: 70 MMHG | TEMPERATURE: 97 F | HEIGHT: 74 IN | RESPIRATION RATE: 14 BRPM | BODY MASS INDEX: 39.4 KG/M2 | WEIGHT: 307 LBS | OXYGEN SATURATION: 97 % | HEART RATE: 67 BPM

## 2025-03-05 DIAGNOSIS — R41.840 ATTENTION DEFICIT: ICD-10-CM

## 2025-03-05 DIAGNOSIS — E66.812 CLASS 2 SEVERE OBESITY DUE TO EXCESS CALORIES WITH SERIOUS COMORBIDITY AND BODY MASS INDEX (BMI) OF 39.0 TO 39.9 IN ADULT: ICD-10-CM

## 2025-03-05 DIAGNOSIS — E66.01 CLASS 2 SEVERE OBESITY DUE TO EXCESS CALORIES WITH SERIOUS COMORBIDITY AND BODY MASS INDEX (BMI) OF 39.0 TO 39.9 IN ADULT: ICD-10-CM

## 2025-03-05 DIAGNOSIS — Z23 NEED FOR VACCINATION: ICD-10-CM

## 2025-03-05 DIAGNOSIS — E78.5 HYPERLIPIDEMIA, UNSPECIFIED HYPERLIPIDEMIA TYPE: ICD-10-CM

## 2025-03-05 DIAGNOSIS — Z11.59 ENCOUNTER FOR HEPATITIS C SCREENING TEST FOR LOW RISK PATIENT: ICD-10-CM

## 2025-03-05 DIAGNOSIS — G47.33 OSA ON CPAP: ICD-10-CM

## 2025-03-05 DIAGNOSIS — Z13.1 SCREENING FOR DIABETES MELLITUS: ICD-10-CM

## 2025-03-05 DIAGNOSIS — R31.9 HEMATURIA, UNSPECIFIED TYPE: ICD-10-CM

## 2025-03-05 DIAGNOSIS — Z00.00 WELLNESS EXAMINATION: Primary | ICD-10-CM

## 2025-03-05 DIAGNOSIS — Z12.5 SCREENING FOR PROSTATE CANCER: ICD-10-CM

## 2025-03-05 PROCEDURE — 1036F TOBACCO NON-USER: CPT | Performed by: FAMILY MEDICINE

## 2025-03-05 PROCEDURE — 90471 IMMUNIZATION ADMIN: CPT | Performed by: FAMILY MEDICINE

## 2025-03-05 PROCEDURE — 99396 PREV VISIT EST AGE 40-64: CPT | Performed by: FAMILY MEDICINE

## 2025-03-05 PROCEDURE — 90656 IIV3 VACC NO PRSV 0.5 ML IM: CPT | Performed by: FAMILY MEDICINE

## 2025-03-05 PROCEDURE — 3008F BODY MASS INDEX DOCD: CPT | Performed by: FAMILY MEDICINE

## 2025-03-05 NOTE — PROGRESS NOTES
Physical Therapy                 Therapy Communication Note    Patient Name: Odilon Will  MRN: [id number]  Department:   Room: Room/bed info not found  Today's Date: 3/5/2025     Discipline: Physical Therapy    Missed Time: No Show

## 2025-03-05 NOTE — ASSESSMENT & PLAN NOTE
Orders:    CBC and Auto Differential; Future    Lipid Panel; Future    TSH with reflex to Free T4 if abnormal; Future

## 2025-03-05 NOTE — PATIENT INSTRUCTIONS
"Happy Birthday  Wishing you a day filled with happiness and a year filled with luba     [email or web address]/[email or web address]      - Dr. FUNK      ============    Health Maintenance:  -   Colonoscopy: 6/20/23- repeat in 10yrs   -   Prostate screening: PSA- 3/5/25  -   AAA screening- smoker -due at 63  -   Hep C screening- 3/5/25  Immunizations:  - COVID    Flu today    Labs today    Suggested watching \"The Secret\" on Amazon (or on iSSimple it's called: \"The Secret full movie English\")  This is a motivational video that talks about the 'Law of Attraction' (positive thinking and speaking)- a principal the can redirect how you think and help you in your daily life!    Insomnia with headaches  - Try to limit your coffee to 2 cups per day  - try to get some exercise  - inc water  - try to get more sleep   - try melatonin --Let me know if you want to try something else (Hydralazine)    Attention  - referral for psych for ADD evaluation           Please follow up in 1yr or as needed.       ** If labs or imaging ordered at today's visit, all the non-urgent results will be discussed at your next visit    If you have been referred for a special test or to a specialist please call  [phone] to schedule an appointment.  If you have any further questions, or if develop new or worsened symptoms, please give our office a call at [phone].    "

## 2025-03-05 NOTE — PROGRESS NOTES
"Subjective   Patient ID: GAMALIEL Will is a 45 y.o. male who presents for Annual Exam.    HPI       Here for a physical  Does not want a chaperone.     Health Maintenance:  -   Colonoscopy: 6/20/23- repeat in 10yrs   -   Prostate screening: PSA- 3/5/25  -   AAA screening- smoker - 63yr  -   Hep C screening- 3/5/25  Immunizations:  - COVID, flu,    Other concerns/issues/followup:  1 - KAHLIL- using CPAP  Followed by sleep med    2 -  Left achilles pain/strain- seen by ortho  Used a Walking boot  Referred to PT- still in  Using volteren prn    3 -  Scrotal abscess - Was with a cyst starting draining with pus- s/p I&D with urology team  Was with hematuria with cycling  Doing well s/p I&D - completely resolved.   Urology recommended cystoscopy  Ct urogram was nml     4- has been wth inc stress  Poor sleep  + headaches- daily when not  Stopped coffee    PMSFH was reviewed & updated.     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   HENT:      Head: Normocephalic.      Right Ear: External ear normal.      Left Ear: External ear normal.      Nose: Nose normal.      Mouth/Throat:      Pharynx: Oropharynx is clear.   Eyes:      Extraocular Movements: Extraocular movements intact.   Neck:      Thyroid: No thyroid mass, thyromegaly or thyroid tenderness.      Vascular: No carotid bruit.   Cardiovascular:      Rate and Rhythm: Normal rate and regular rhythm.      Heart " sounds: Normal heart sounds. No murmur heard.  Pulmonary:      Effort: Pulmonary effort is normal. No respiratory distress.      Breath sounds: Normal breath sounds. No wheezing.   Abdominal:      General: Bowel sounds are normal.      Palpations: Abdomen is soft. There is no mass.      Tenderness: There is no abdominal tenderness.   Musculoskeletal:         General: Normal range of motion.      Cervical back: Normal range of motion.      Right lower leg: No edema.      Left lower leg: No edema.   Skin:     General: Skin is warm and dry.      Findings: No rash.   Neurological:      General: No focal deficit present.      Mental Status: He is alert and oriented to person, place, and time.      Motor: No weakness.   Psychiatric:         Mood and Affect: Mood normal.         Behavior: Behavior normal.         Assessment/Plan   Assessment & Plan  Wellness examination    Orders:    CBC and Auto Differential; Future    Comprehensive Metabolic Panel; Future    Hemoglobin A1C; Future    Lipid Panel; Future    TSH with reflex to Free T4 if abnormal; Future    Prostate Specific Antigen; Future    Hepatitis C antibody; Future    Screening for diabetes mellitus    Orders:    Hemoglobin A1C; Future    Hyperlipidemia, unspecified hyperlipidemia type    Orders:    CBC and Auto Differential; Future    Lipid Panel; Future    TSH with reflex to Free T4 if abnormal; Future    KAHLIL on CPAP    Orders:    CBC and Auto Differential; Future    Class 2 severe obesity due to excess calories with serious comorbidity and body mass index (BMI) of 39.0 to 39.9 in adult    Orders:    Comprehensive Metabolic Panel; Future    Hemoglobin A1C; Future    Lipid Panel; Future    TSH with reflex to Free T4 if abnormal; Future    Screening for prostate cancer    Orders:    Prostate Specific Antigen; Future    Encounter for hepatitis C screening test for low risk patient    Orders:    Hepatitis C antibody; Future    Hematuria, unspecified type    Orders:     Urinalysis with Reflex Microscopic; Future    Need for vaccination [Z23]    Orders:    Flu vaccine, trivalent, preservative free, age 6 months and greater (Fluarix/Fluzone/Flulaval)    Attention deficit    Orders:    Referral to Psychiatry; Future             Please follow up in 1yr or as needed.

## 2025-03-06 LAB
ALBUMIN SERPL-MCNC: 4.4 G/DL (ref 3.6–5.1)
ALP SERPL-CCNC: 62 U/L (ref 36–130)
ALT SERPL-CCNC: 23 U/L (ref 9–46)
ANION GAP SERPL CALCULATED.4IONS-SCNC: 9 MMOL/L (CALC) (ref 7–17)
APPEARANCE UR: CLEAR
AST SERPL-CCNC: 15 U/L (ref 10–40)
BASOPHILS # BLD AUTO: 43 CELLS/UL (ref 0–200)
BASOPHILS NFR BLD AUTO: 0.7 %
BILIRUB SERPL-MCNC: 0.5 MG/DL (ref 0.2–1.2)
BILIRUB UR QL STRIP: NEGATIVE
BUN SERPL-MCNC: 13 MG/DL (ref 7–25)
CALCIUM SERPL-MCNC: 9.6 MG/DL (ref 8.6–10.3)
CHLORIDE SERPL-SCNC: 105 MMOL/L (ref 98–110)
CHOLEST SERPL-MCNC: 197 MG/DL
CHOLEST/HDLC SERPL: 2.8 (CALC)
CO2 SERPL-SCNC: 26 MMOL/L (ref 20–32)
COLOR UR: YELLOW
CREAT SERPL-MCNC: 0.83 MG/DL (ref 0.6–1.29)
EGFRCR SERPLBLD CKD-EPI 2021: 109 ML/MIN/1.73M2
EOSINOPHIL # BLD AUTO: 177 CELLS/UL (ref 15–500)
EOSINOPHIL NFR BLD AUTO: 2.9 %
ERYTHROCYTE [DISTWIDTH] IN BLOOD BY AUTOMATED COUNT: 13.1 % (ref 11–15)
EST. AVERAGE GLUCOSE BLD GHB EST-MCNC: 120 MG/DL
EST. AVERAGE GLUCOSE BLD GHB EST-SCNC: 6.6 MMOL/L
GLUCOSE SERPL-MCNC: 100 MG/DL (ref 65–99)
GLUCOSE UR QL STRIP: NEGATIVE
HBA1C MFR BLD: 5.8 % OF TOTAL HGB
HCT VFR BLD AUTO: 42.2 % (ref 38.5–50)
HCV AB SERPL QL IA: NORMAL
HDLC SERPL-MCNC: 71 MG/DL
HGB BLD-MCNC: 13.4 G/DL (ref 13.2–17.1)
HGB UR QL STRIP: NEGATIVE
KETONES UR QL STRIP: NEGATIVE
LDLC SERPL CALC-MCNC: 114 MG/DL (CALC)
LEUKOCYTE ESTERASE UR QL STRIP: NEGATIVE
LYMPHOCYTES # BLD AUTO: 2336 CELLS/UL (ref 850–3900)
LYMPHOCYTES NFR BLD AUTO: 38.3 %
MCH RBC QN AUTO: 26.7 PG (ref 27–33)
MCHC RBC AUTO-ENTMCNC: 31.8 G/DL (ref 32–36)
MCV RBC AUTO: 84.1 FL (ref 80–100)
MONOCYTES # BLD AUTO: 555 CELLS/UL (ref 200–950)
MONOCYTES NFR BLD AUTO: 9.1 %
NEUTROPHILS # BLD AUTO: 2989 CELLS/UL (ref 1500–7800)
NEUTROPHILS NFR BLD AUTO: 49 %
NITRITE UR QL STRIP: NEGATIVE
NONHDLC SERPL-MCNC: 126 MG/DL (CALC)
PH UR STRIP: 7 [PH] (ref 5–8)
PLATELET # BLD AUTO: 185 THOUSAND/UL (ref 140–400)
PMV BLD REES-ECKER: 10.4 FL (ref 7.5–12.5)
POTASSIUM SERPL-SCNC: 4.4 MMOL/L (ref 3.5–5.3)
PROT SERPL-MCNC: 6.6 G/DL (ref 6.1–8.1)
PROT UR QL STRIP: NEGATIVE
PSA SERPL-MCNC: 1.02 NG/ML
RBC # BLD AUTO: 5.02 MILLION/UL (ref 4.2–5.8)
SODIUM SERPL-SCNC: 140 MMOL/L (ref 135–146)
SP GR UR STRIP: 1.02 (ref 1–1.03)
TRIGL SERPL-MCNC: 40 MG/DL
TSH SERPL-ACNC: 1.51 MIU/L (ref 0.4–4.5)
WBC # BLD AUTO: 6.1 THOUSAND/UL (ref 3.8–10.8)

## 2025-03-17 ENCOUNTER — TREATMENT (OUTPATIENT)
Dept: PHYSICAL THERAPY | Facility: CLINIC | Age: 45
End: 2025-03-17
Payer: COMMERCIAL

## 2025-03-17 DIAGNOSIS — S86.012D STRAIN OF LEFT ACHILLES TENDON, SUBSEQUENT ENCOUNTER: ICD-10-CM

## 2025-03-17 DIAGNOSIS — M76.60 ACHILLES TENDON PAIN: Primary | ICD-10-CM

## 2025-03-17 PROCEDURE — 97110 THERAPEUTIC EXERCISES: CPT | Mod: GP | Performed by: PHYSICAL THERAPIST

## 2025-03-17 PROCEDURE — 97140 MANUAL THERAPY 1/> REGIONS: CPT | Mod: GP | Performed by: PHYSICAL THERAPIST

## 2025-03-17 NOTE — PROGRESS NOTES
"Physical Therapy    Physical Therapy Treatment    Patient Name: Odilon Will  MRN: [id number]  Today's Date: 3/17/2025  Time Entry:   Time Calculation  Start Time: 1705  Stop Time: 1745  Time Calculation (min): 40 min     PT Therapeutic Procedures Time Entry  Manual Therapy Time Entry: 15  Therapeutic Exercise Time Entry: 20     Insurance: [place], $35 copay, 50 PT/OT/SP, no auth  Visit #3    Assessment:  Progressed to eccentric calf raise on incline board for second set without reactive Achilles tendon pain. Increased seated calf raise resistance without much difficulty.    Plan:  Continue IASTM, calf stretching and progressive tendon loading program.  Add seated > standing arch lifts.    Problem List Items Addressed This Visit    None  Visit Diagnoses         Codes    Achilles tendon pain    -  Primary M76.60    Strain of left Achilles tendon, subsequent encounter     S86.012D            Subjective   \"It's better. I almost never have pain when I first get up in the morning. I feel it most when I get out of the car or when sitting for a while.\" Pt needs to end session early today.    Precautions  STEADI Fall Risk Score (The score of 4 or more indicates an increased risk of falling): 0    Pain  L Achilles 3/10 getting out of car in the parking lot, 1/10 currently    Objective   From Eval:  Length tests (R/L in degrees):   Gastrocs- 0 / 0  Soleus- 0 / 0  90/90 hamstrings- (30) / (35)    Treatments:  Manual Therapy:   IASTM to L calf and Achilles passive and active    Therapeutic Exercise:  Upright bike x 5 mins  Long sitting towel calf stretch 3x30 sec L  Long sitting ankle PF, gray band, x 20 L  Eccentric calf raises; floor x 10, incline board x 10  Seated calf raises 66# x 30  Standing calf stretch 3x30 sec L- not today    Goals:  Active       PT Problem       PT Goal 1       Start:  01/28/25    Expected End:  04/01/25       Increase R gastrocsoleus MMT to 5/5 without pain.         PT Goal 2       Start:  01/28/25   "  Expected End:  04/01/25       Increase B gastrocsoleus length by at least 5 degrees.         PT Goal 3       Start:  01/28/25    Expected End:  04/01/25       Pt will amb with proper heel strike/toe off pattern and equal stance time and step length.         PT Goal 4       Start:  01/28/25    Expected End:  04/01/25       Pt will report 0/10 L Achilles pain to improve walking, stair negotiation, exercising and coaching.         PT Goal 5       Start:  01/28/25    Expected End:  04/01/25       Improve LEFS by at least 9 points (MDIC).         PT Goal 6       Start:  01/28/25    Expected End:  04/01/25       Pt will demonstrate independence with HEP.

## 2025-03-29 ENCOUNTER — PATIENT MESSAGE (OUTPATIENT)
Dept: PRIMARY CARE | Facility: CLINIC | Age: 45
End: 2025-03-29
Payer: COMMERCIAL

## 2025-03-29 DIAGNOSIS — B00.1 COLD SORE: Primary | ICD-10-CM

## 2025-03-30 RX ORDER — VALACYCLOVIR HYDROCHLORIDE 1 G/1
2000 TABLET, FILM COATED ORAL 2 TIMES DAILY
Qty: 12 TABLET | Refills: 3 | Status: SHIPPED | OUTPATIENT
Start: 2025-03-30 | End: 2025-03-31

## 2025-07-07 SDOH — SOCIAL STABILITY: SOCIAL NETWORK: PROMIS ABILITY TO PARTICIPATE IN SOCIAL ROLES & ACTIVITIES T-SCORE: 54

## 2025-07-07 SDOH — ECONOMIC STABILITY: FOOD INSECURITY: WITHIN THE PAST 12 MONTHS, YOU WORRIED THAT YOUR FOOD WOULD RUN OUT BEFORE YOU GOT MONEY TO BUY MORE.: NEVER TRUE

## 2025-07-07 SDOH — SOCIAL STABILITY: SOCIAL NETWORK

## 2025-07-07 SDOH — SOCIAL STABILITY: SOCIAL NETWORK: I HAVE TROUBLE DOING ALL OF MY USUAL WORK (INCLUDE WORK AT HOME): RARELY

## 2025-07-07 SDOH — ECONOMIC STABILITY: FOOD INSECURITY: WITHIN THE PAST 12 MONTHS, THE FOOD YOU BOUGHT JUST DIDN'T LAST AND YOU DIDN'T HAVE MONEY TO GET MORE.: NEVER TRUE

## 2025-07-07 SDOH — SOCIAL STABILITY: SOCIAL NETWORK: I HAVE TROUBLE DOING ALL OF MY REGULAR LEISURE ACTIVITIES WITH OTHERS: NEVER

## 2025-07-07 SDOH — SOCIAL STABILITY: SOCIAL NETWORK: I HAVE TROUBLE DOING ALL OF THE FAMILY ACTIVITIES THAT I WANT TO DO: SOMETIMES

## 2025-07-07 SDOH — SOCIAL STABILITY: SOCIAL NETWORK: I HAVE TROUBLE DOING ALL OF THE ACTIVITIES WITH FRIENDS THAT I WANT TO DO: NEVER

## 2025-07-07 ASSESSMENT — PROMIS GLOBAL HEALTH SCALE
RATE_AVERAGE_FATIGUE: MILD
RATE_GENERAL_HEALTH: GOOD
EMOTIONAL_PROBLEMS: NEVER
CARRYOUT_PHYSICAL_ACTIVITIES: MOSTLY
RATE_AVERAGE_PAIN: 3
RATE_MENTAL_HEALTH: EXCELLENT
RATE_PHYSICAL_HEALTH: GOOD
RATE_SOCIAL_SATISFACTION: VERY GOOD
RATE_QUALITY_OF_LIFE: GOOD
CARRYOUT_SOCIAL_ACTIVITIES: VERY GOOD

## 2025-07-07 ASSESSMENT — ANXIETY QUESTIONNAIRES
PAST MONTH HOW OFTEN HAVE YOU FELT CONFIDENT ABOUT YOUR ABILITY TO HANDLE YOUR PROBLEMS: 4 - VERY OFTEN
PAST MONTH HOW OFTEN HAVE YOU FELT THAT THINGS WERE GOING YOUR WAY: 3 - FAIRLY OFTEN
PAST MONTH HOW OFTEN HAVE YOU FELT THAT YOU WERE UNABLE TO CONTROL THE IMPORTANT THINGS IN YOUR LIFE: 0 - NEVER
PAST MONTH HOW OFTEN HAVE YOU FELT DIFFICULTIES WERE PILING UP SO HIGH THAT YOU COULD NOT OVERCOME THEM: 0 - NEVER
PAST MONTH HOW OFTEN HAVE YOU FELT CONFIDENT ABOUT YOUR ABILITY TO HANDLE YOUR PROBLEMS: 4 - VERY OFTEN
PAST MONTH HOW OFTEN HAVE YOU FELT THAT YOU WERE UNABLE TO CONTROL THE IMPORTANT THINGS IN YOUR LIFE: 0 - NEVER

## 2025-07-14 ENCOUNTER — APPOINTMENT (OUTPATIENT)
Dept: INTEGRATIVE MEDICINE | Facility: CLINIC | Age: 45
End: 2025-07-14
Payer: COMMERCIAL

## 2025-07-14 DIAGNOSIS — M99.03 SEGMENTAL AND SOMATIC DYSFUNCTION OF LUMBAR REGION: Primary | ICD-10-CM

## 2025-07-14 DIAGNOSIS — M79.10 MYALGIA: ICD-10-CM

## 2025-07-14 DIAGNOSIS — M99.01 SEGMENTAL AND SOMATIC DYSFUNCTION OF CERVICAL REGION: ICD-10-CM

## 2025-07-14 DIAGNOSIS — M99.02 SEGMENTAL AND SOMATIC DYSFUNCTION OF THORACIC REGION: ICD-10-CM

## 2025-07-14 DIAGNOSIS — M54.59 MECHANICAL LOW BACK PAIN: ICD-10-CM

## 2025-07-14 DIAGNOSIS — M79.9 POSTURAL STRAIN: ICD-10-CM

## 2025-07-14 DIAGNOSIS — M99.04 SEGMENTAL AND SOMATIC DYSFUNCTION OF SACRAL REGION: ICD-10-CM

## 2025-07-14 PROCEDURE — 99203 OFFICE O/P NEW LOW 30 MIN: CPT | Performed by: CHIROPRACTOR

## 2025-07-14 PROCEDURE — 98941 CHIROPRACT MANJ 3-4 REGIONS: CPT | Performed by: CHIROPRACTOR

## 2025-07-14 NOTE — PROGRESS NOTES
Received refill request for Albuterol   Request is approved  because refill protocol Met approval criteria  LOV and or labs were verified to be correct  Refill sent to pharmacy    Last OV 3-22-24.       albuterol 108 (90 Base) MCG/ACT inhaler         Sig: Inhale 2 puffs into the lungs every 4 hours as needed for Shortness of Breath or Wheezing.    Disp: 1 each    Refills: 11    Start: 3/18/2025 - 4/2/2025    Class: Eprescribe    Non-formulary For: Mild intermittent asthma without complication (CMD)    Last ordered: 6 months ago (9/16/2024) by David Wilkinson, DO    Patient comment: Can you please provide 2 inhalers on this prescription?    Short Acting Inhaled Beta-Agonists Refill Protocol - 12 Month Protocol [id number]/18/2025 09:25 PM   Protocol Details Seen by prescribing provider or same department within the last 12 months or has a future appt in 3 months - IF FAILED PLEASE LOOK AT CHART REVIEW FOR LAST VISIT AND PROCEED ACCORDINGLY    Medication (including dose and sig) on current meds list         Subjective   Patient ID: Odilon Will is a 45 y.o. male who presents today, July 14, 2025 for a new patient evaluation and treatment of neck and low back pain.    (1/20) [facility]    Chiropractic Medicine HPI:  Provacative factors include : walking, bending, squatting  Palliative factors incude : stretching  Pain is described as : burning, pulling   Previous treatment for complaint has included : stretching, Physical Therapy, Chiropractic care  Patient denies : trauma/accidents/injuries/falls (last 6 months), numbness/tingling, bladder weakness, bowel weakness, difficulty walking, instability, radiating pain into the distal extremity, catching, clicking, grinding, popping  Patient rates severity of pain at : 0/10 on a numerical pain scale  Patient rates least severe pain at : 0/10 on a numerical pain scale  Patient rates most severe pain at : 8/10 on a numerical pain scale   Completed Oswestry Disability Index prior to visit: yes     Odilon presents for evaluation and treatment of low back pain and neck tension. Patient states that this flare up of low back symptoms began in March 2025. He states that this flare up has lasted longer and has been more constant than any other. He states that pain starts midline and goes to the left and he describes it as a burning/pulling pain. He states that he does not have any pain currently. He states that bending, squatting and walking can increase symptoms while stretching helps relieve symptoms. He states that he has had chiropractic care for these symptoms and neck tension in the past. He states that he has had one session of PT and does the exercises and stretches from them. He states that he occasionally will have lower neck and upper back tension, R>L. He states that he has no pain in his neck today. Patient denies any headaches, difficulty speaking/swallowing, vision changes, bowel/bladder issues, chest pain/shortness of breath, numbness/tingling. PMH: bilateral meniscus  repair surgeries 2007, 2020; left labrum tear repair 2020.          Objective   Review of Systems   Constitutional:  Negative for chills, fatigue, fever and unexpected weight change.   HENT:  Negative for trouble swallowing.    Eyes:  Negative for visual disturbance.   Respiratory:  Negative for chest tightness and shortness of breath.    Cardiovascular:  Negative for chest pain and leg swelling.   Gastrointestinal:  Negative for vomiting.   Genitourinary:  Negative for difficulty urinating and flank pain.   Musculoskeletal:  Positive for back pain, myalgias and neck stiffness. Negative for arthralgias, gait problem, joint swelling and neck pain.   Neurological:  Negative for dizziness, weakness, light-headedness, numbness and headaches.   Psychiatric/Behavioral:  Negative for self-injury and suicidal ideas.    All other systems reviewed and are negative.    Physical Exam  Constitutional:       General: He is not in acute distress.     Appearance: Normal appearance.     HENT:      Head: Normocephalic and atraumatic.     Musculoskeletal:      Cervical back: Tenderness present. Decreased range of motion.      Thoracic back: Tenderness present.      Lumbar back: Tenderness present. Decreased range of motion.     Neurological:      General: No focal deficit present.      Mental Status: He is alert and oriented to person, place, and time.      Cranial Nerves: No dysarthria or facial asymmetry.      Sensory: Sensation is intact.      Motor: Motor function is intact.      Coordination: Coordination is intact.      Gait: Gait is intact.     Psychiatric:         Attention and Perception: Attention normal.         Mood and Affect: Mood normal.         Speech: Speech normal.         Behavior: Behavior is cooperative.        Spine Musculoskeletal Exam    Gait    Gait is normal.    Inspection    Cervical Spine    Cervical spine inspection additional comments: Forward head carriage and bilateral rounded shoulders.     Palpation     Cervical Spine    Tenderness: present      Paraspinous: right and left      Trapezius: right and left      Periscapular: right and left    Right      Muscle tone: increased    Left      Muscle tone: increased    Thoracolumbar    Tenderness: present      Paraspinous: right and left      Iliac crest: left      SI Joint: left    Right      Muscle tone: increased    Left      Muscle tone: increased    Range of Motion    Cervical Spine       Cervical flexion: chin to chest. Cervical flexion detail: no pain.     Cervical extension: reduced extension (0-25%). Cervical extension detail: no pain.       Right      Lateral bending: reduced bend (0-25%). Lateral bending detail: no pain.       Lateral rotation: reduced rotation (0-25%). Lateral rotation detail: no pain.       Left      Lateral bending: reduced bend (0-25%). Lateral bending detail: no pain.       Lateral rotation: reduced rotation (0-25%). Lateral rotation detail: no pain.      Thoracolumbar       Flexion: normal. Flexion detail: pain.     Extension: 75%. Extension detail: no pain.       Right      Lateral bending: normal. Lateral bending detail: no pain.       Lateral rotation: normal. Lateral rotation detail: no pain.       Left      Lateral bending: normal. Lateral bending detail: no pain.       Lateral rotation: normal. Lateral rotation detail: no pain.      Strength    Cervical Spine    Cervical spine motor exam is normal.    Thoracolumbar    Thoracolumbar motor exam is normal.       Sensory    Cervical Spine    Cervical spine sensation is normal.    Thoracolumbar    Thoracolumbar sensation is normal.    Special Tests    Cervical Spine    Cervical distraction - neck: no effect    General    Neurological: alert       Orthopedic Tests:  Cervical: Maximum compression Bilateral and Negative.  Thoracic/Lumbar/Sacrum: Lumbosacral [place]'s  Bilateral and Negative.    Segmental Joint(s): Segmental joint dysfunction was assessed with motion palpation and is identified  in the following areas:  Cervical : C6 C7  Thoracic : T1, T2., T5, T6, and T10  Lumbopelvic / Sacral SIJ : L4, L5/S1, and L SIJ  Lower Extremities : R Hip and L Hip    Assessment/Plan   Today's Treatment Included: Spinal manipulation to the following regions of segmental dysfunction identified on examination, using age-appropriate and injury specific force. Segmental Joint(s) Cervical : C6 C7 Segmental Joint(s) Thoracic : T1, T2., T5, T6, and T10 Segmental Joint(s) Lumbopelvic/Sacral SIJ : L4, L5/S1, and L SIJ  Extremity manipulation performed to the following regions:  Lower Extremities : R Hip and L Hip  Chiropractic manipulation treatment techniques utilized: Diversified CMT and Pelvic drop table technique  Soft tissue mobilization techniques utilized during treatment: Ischemic compression    Cervical paraspinal mm. bilateral, Upper Trapezius bilateral, and Levator Scap. bilateral  Thoracic Paraspinal mm. bilateral and Middle Trapezius bilateral  Lumbar Paraspinal mm. bilateral and Quadratus Lumborum left    Patient was shown seated and side lying QL/paraspinal stretches.    Treatment Plan: The patient and I discussed the risks and benefits of Chiropractic Care.  Consent for care was given both written and orally by the patient.  Based on the patient's subjective complaints along with the examination findings, it is advised that a course of Chiropractic Treatment by initiated in the form of:   Chiropractic Manipulation (CMT)  Neuro-Muscular Re-education  Integrative Dry Needing (IDN)  Chiropractic treatment recommended at a frequency of 1 times per week for 3 weeks  until symptoms are mild or manageable  The goals of the treatment will be to: decrease pain, increase activity, reduce lower back pain, improve quality of life, improve ability to walk, and decrease muscular hypertonicity  The patient tolerated today's treatment with little or no additional discomfort and was instructed to contact the office for  questions or concerns.   Follow up as scheduled.

## 2025-07-16 ASSESSMENT — ENCOUNTER SYMPTOMS
NECK PAIN: 0
VOMITING: 0
MYALGIAS: 1
NUMBNESS: 0
FATIGUE: 0
BACK PAIN: 1
FEVER: 0
SHORTNESS OF BREATH: 0
NECK STIFFNESS: 1
UNEXPECTED WEIGHT CHANGE: 0
DIFFICULTY URINATING: 0
CHEST TIGHTNESS: 0
ARTHRALGIAS: 0
FLANK PAIN: 0
WEAKNESS: 0
DIZZINESS: 0
TROUBLE SWALLOWING: 0
HEADACHES: 0
JOINT SWELLING: 0
CHILLS: 0
LIGHT-HEADEDNESS: 0

## 2025-08-08 ENCOUNTER — APPOINTMENT (OUTPATIENT)
Dept: INTEGRATIVE MEDICINE | Facility: CLINIC | Age: 45
End: 2025-08-08
Payer: COMMERCIAL

## 2025-08-08 DIAGNOSIS — M54.59 MECHANICAL LOW BACK PAIN: ICD-10-CM

## 2025-08-08 DIAGNOSIS — M99.01 SEGMENTAL AND SOMATIC DYSFUNCTION OF CERVICAL REGION: ICD-10-CM

## 2025-08-08 DIAGNOSIS — M99.02 SEGMENTAL AND SOMATIC DYSFUNCTION OF THORACIC REGION: ICD-10-CM

## 2025-08-08 DIAGNOSIS — M79.10 MYALGIA: ICD-10-CM

## 2025-08-08 DIAGNOSIS — M99.03 SEGMENTAL AND SOMATIC DYSFUNCTION OF LUMBAR REGION: Primary | ICD-10-CM

## 2025-08-08 DIAGNOSIS — M79.9 POSTURAL STRAIN: ICD-10-CM

## 2025-08-08 DIAGNOSIS — M99.04 SEGMENTAL AND SOMATIC DYSFUNCTION OF SACRAL REGION: ICD-10-CM

## 2025-08-08 PROCEDURE — 98941 CHIROPRACT MANJ 3-4 REGIONS: CPT | Performed by: CHIROPRACTOR

## 2025-08-08 NOTE — PROGRESS NOTES
Subjective   Patient ID: Odilon Will is a 45 y.o. male who presents August 8, 2025 for neck and low back pain.    (2/20) [facility]    Today, the patient rates their degree of pain as a 0 out of 10 on the numeric pain rating scale.     Odilon returns for continued treatment of neck and low back pain. Patient states that he did not note any significant change in symptoms after last visit. He states that he had a few flare ups since last visit, mostly while squatting down to put dishes away in a low corner cabinet. He states that he did not note any increased soreness after last visit. Denies any associated symptoms or change in medical history since last visit.   _____________________________________________________________  Initial Exam:  Odilon presents for evaluation and treatment of low back pain and neck tension. Patient states that this flare up of low back symptoms began in March 2025. He states that this flare up has lasted longer and has been more constant than any other. He states that pain starts midline and goes to the left and he describes it as a burning/pulling pain. He states that he does not have any pain currently. He states that bending, squatting and walking can increase symptoms while stretching helps relieve symptoms. He states that he has had chiropractic care for these symptoms and neck tension in the past. He states that he has had one session of PT and does the exercises and stretches from them. He states that he occasionally will have lower neck and upper back tension, R>L. He states that he has no pain in his neck today. Patient denies any headaches, difficulty speaking/swallowing, vision changes, bowel/bladder issues, chest pain/shortness of breath, numbness/tingling. PMH: bilateral meniscus repair surgeries 2007, 2020; left labrum tear repair 2020.          Objective   Physical Exam  Constitutional:       General: He is not in acute distress.     Appearance: Normal appearance.     HENT:       Head: Normocephalic and atraumatic.     Musculoskeletal:      Cervical back: Tenderness present. Decreased range of motion.      Thoracic back: Tenderness present.      Lumbar back: Tenderness present. Decreased range of motion.     Neurological:      General: No focal deficit present.      Mental Status: He is alert and oriented to person, place, and time.      Cranial Nerves: No dysarthria or facial asymmetry.      Sensory: Sensation is intact.      Motor: Motor function is intact.      Coordination: Coordination is intact.      Gait: Gait is intact.     Psychiatric:         Attention and Perception: Attention normal.         Mood and Affect: Mood normal.         Speech: Speech normal.         Behavior: Behavior is cooperative.         Palpation of the following regions revealed:  Cervical: Upper trapezius bilateral, hypertonicity and tenderness.  Levator scapulae bilateral, hypertonicity and tenderness.  Cervical paraspinals bilateral, hypertonicity and tenderness.  Thoracic: Thoracic paraspinals bilateral, hypertonicity and tenderness.  Middle trapezius bilateral, hypertonicity and tenderness.  Lumbar: Lumbar paraspinals bilateral, hypertonicity and tenderness.  Quadratus lumborum left, hypertonicity and tenderness.  Psoas left, hypertonicity and tenderness.    Segmental Joint(s): Segmental joint dysfunction was assessed with motion palpation and is identified in the following areas:  Cervical : C6 C7  Thoracic : T1, T2., T5, T6, and T10  Lumbopelvic / Sacral SIJ : L4, L5/S1, and L SIJ  Lower Extremities : R Hip and L Hip    Assessment/Plan   Today's Treatment Included: Spinal manipulation to the following regions of segmental dysfunction identified on examination, using age-appropriate and injury specific force. Segmental Joint(s) Cervical : C6 C7 Segmental Joint(s) Thoracic : T1, T2., T5, T6, and T10 Segmental Joint(s) Lumbopelvic/Sacral SIJ : L4, L5/S1, and L SIJ  Extremity manipulation performed to the  following regions:  Lower Extremities : R Hip and L Hip  Chiropractic manipulation treatment techniques utilized: Diversified CMT and Pelvic drop table technique  Soft tissue mobilization techniques utilized during treatment: Ischemic compression    Soft-tissue mobilization was performed in the following areas:  Cervical paraspinal mm. bilateral, Upper Trapezius bilateral, and Levator Scap. bilateral  Thoracic Paraspinal mm. bilateral and Middle Trapezius bilateral  Lumbar Paraspinal mm. bilateral, Quadratus Lumborum left, and Psoas left    Patient was shown psoas stretches.     Recommended follow-up in 1 week(s).     The patient tolerated today's treatment with little or no additional discomfort and was instructed to contact the office for questions or concerns.

## 2025-08-18 ENCOUNTER — APPOINTMENT (OUTPATIENT)
Dept: INTEGRATIVE MEDICINE | Facility: CLINIC | Age: 45
End: 2025-08-18
Payer: COMMERCIAL

## 2025-08-18 DIAGNOSIS — M99.03 SEGMENTAL AND SOMATIC DYSFUNCTION OF LUMBAR REGION: Primary | ICD-10-CM

## 2025-08-18 DIAGNOSIS — M79.9 POSTURAL STRAIN: ICD-10-CM

## 2025-08-18 DIAGNOSIS — M99.04 SEGMENTAL AND SOMATIC DYSFUNCTION OF SACRAL REGION: ICD-10-CM

## 2025-08-18 DIAGNOSIS — M79.10 MYALGIA: ICD-10-CM

## 2025-08-18 DIAGNOSIS — M54.59 MECHANICAL LOW BACK PAIN: ICD-10-CM

## 2025-08-18 DIAGNOSIS — M99.02 SEGMENTAL AND SOMATIC DYSFUNCTION OF THORACIC REGION: ICD-10-CM

## 2025-08-18 DIAGNOSIS — M99.01 SEGMENTAL AND SOMATIC DYSFUNCTION OF CERVICAL REGION: ICD-10-CM

## 2025-08-18 PROCEDURE — 98941 CHIROPRACT MANJ 3-4 REGIONS: CPT | Performed by: CHIROPRACTOR

## 2025-09-10 ENCOUNTER — APPOINTMENT (OUTPATIENT)
Dept: INTEGRATIVE MEDICINE | Facility: CLINIC | Age: 45
End: 2025-09-10
Payer: COMMERCIAL

## 2025-09-22 ENCOUNTER — APPOINTMENT (OUTPATIENT)
Dept: INTEGRATIVE MEDICINE | Facility: CLINIC | Age: 45
End: 2025-09-22
Payer: COMMERCIAL

## 2025-10-08 ENCOUNTER — APPOINTMENT (OUTPATIENT)
Dept: INTEGRATIVE MEDICINE | Facility: CLINIC | Age: 45
End: 2025-10-08
Payer: COMMERCIAL

## 2025-10-22 ENCOUNTER — APPOINTMENT (OUTPATIENT)
Dept: INTEGRATIVE MEDICINE | Facility: CLINIC | Age: 45
End: 2025-10-22
Payer: COMMERCIAL

## 2026-03-02 ENCOUNTER — APPOINTMENT (OUTPATIENT)
Dept: SLEEP MEDICINE | Facility: CLINIC | Age: 46
End: 2026-03-02
Payer: COMMERCIAL

## 2026-03-05 ENCOUNTER — APPOINTMENT (OUTPATIENT)
Dept: PRIMARY CARE | Facility: CLINIC | Age: 46
End: 2026-03-05
Payer: COMMERCIAL